# Patient Record
Sex: FEMALE | Race: WHITE | Employment: OTHER | URBAN - NONMETROPOLITAN AREA
[De-identification: names, ages, dates, MRNs, and addresses within clinical notes are randomized per-mention and may not be internally consistent; named-entity substitution may affect disease eponyms.]

---

## 2023-12-13 ENCOUNTER — HOSPITAL ENCOUNTER (INPATIENT)
Facility: HOSPITAL | Age: 68
LOS: 13 days | Discharge: HOME | DRG: 885 | End: 2023-12-26
Attending: PSYCHIATRY & NEUROLOGY | Admitting: PSYCHIATRY & NEUROLOGY
Payer: MEDICARE

## 2023-12-13 PROBLEM — F20.9 SCHIZOPHRENIA (HCC): Status: ACTIVE | Noted: 2023-12-13

## 2023-12-13 PROBLEM — F31.70 HISTORY OF DEPRESSED BIPOLAR DISORDER (HCC): Status: ACTIVE | Noted: 2023-12-13

## 2023-12-13 LAB — TSH SERPL DL<=0.05 MIU/L-ACNC: 1.13 UIU/ML (ref 0.36–3.74)

## 2023-12-13 PROCEDURE — 36415 COLL VENOUS BLD VENIPUNCTURE: CPT

## 2023-12-13 PROCEDURE — 6370000000 HC RX 637 (ALT 250 FOR IP): Performed by: PHYSICIAN ASSISTANT

## 2023-12-13 PROCEDURE — 80061 LIPID PANEL: CPT

## 2023-12-13 PROCEDURE — 84443 ASSAY THYROID STIM HORMONE: CPT

## 2023-12-13 PROCEDURE — 6370000000 HC RX 637 (ALT 250 FOR IP): Performed by: PSYCHIATRY & NEUROLOGY

## 2023-12-13 PROCEDURE — 1240000000 HC EMOTIONAL WELLNESS R&B

## 2023-12-13 RX ORDER — CHLORTHALIDONE 25 MG/1
25 TABLET ORAL DAILY
COMMUNITY

## 2023-12-13 RX ORDER — HALOPERIDOL 5 MG/1
5 TABLET ORAL EVERY 4 HOURS PRN
Status: DISCONTINUED | OUTPATIENT
Start: 2023-12-13 | End: 2023-12-26 | Stop reason: HOSPADM

## 2023-12-13 RX ORDER — BUPROPION HYDROCHLORIDE 150 MG/1
450 TABLET ORAL EVERY MORNING
Status: ON HOLD | COMMUNITY
End: 2023-12-25 | Stop reason: HOSPADM

## 2023-12-13 RX ORDER — CHLORTHALIDONE 25 MG/1
25 TABLET ORAL DAILY
Status: DISCONTINUED | OUTPATIENT
Start: 2023-12-13 | End: 2023-12-26 | Stop reason: HOSPADM

## 2023-12-13 RX ORDER — HYDROXYZINE PAMOATE 25 MG/1
50 CAPSULE ORAL 4 TIMES DAILY PRN
Status: DISCONTINUED | OUTPATIENT
Start: 2023-12-13 | End: 2023-12-26 | Stop reason: HOSPADM

## 2023-12-13 RX ORDER — ENALAPRIL MALEATE 5 MG/1
10 TABLET ORAL DAILY
Status: DISCONTINUED | OUTPATIENT
Start: 2023-12-13 | End: 2023-12-26 | Stop reason: HOSPADM

## 2023-12-13 RX ORDER — ASPIRIN 81 MG/1
81 TABLET ORAL DAILY
Status: DISCONTINUED | OUTPATIENT
Start: 2023-12-13 | End: 2023-12-26 | Stop reason: HOSPADM

## 2023-12-13 RX ORDER — POLYETHYLENE GLYCOL 3350 17 G/17G
17 POWDER, FOR SOLUTION ORAL DAILY PRN
Status: DISCONTINUED | OUTPATIENT
Start: 2023-12-13 | End: 2023-12-26 | Stop reason: HOSPADM

## 2023-12-13 RX ORDER — ZOLPIDEM TARTRATE 5 MG/1
10 TABLET ORAL NIGHTLY PRN
Status: DISCONTINUED | OUTPATIENT
Start: 2023-12-13 | End: 2023-12-13

## 2023-12-13 RX ORDER — HALOPERIDOL 5 MG/ML
5 INJECTION INTRAMUSCULAR EVERY 4 HOURS PRN
Status: DISCONTINUED | OUTPATIENT
Start: 2023-12-13 | End: 2023-12-26 | Stop reason: HOSPADM

## 2023-12-13 RX ORDER — HYDROXYZINE 50 MG/1
50 TABLET, FILM COATED ORAL 3 TIMES DAILY PRN
Status: DISCONTINUED | OUTPATIENT
Start: 2023-12-13 | End: 2023-12-26 | Stop reason: HOSPADM

## 2023-12-13 RX ORDER — TRAZODONE HYDROCHLORIDE 50 MG/1
50 TABLET ORAL NIGHTLY PRN
Status: DISCONTINUED | OUTPATIENT
Start: 2023-12-13 | End: 2023-12-16

## 2023-12-13 RX ORDER — MAGNESIUM HYDROXIDE/ALUMINUM HYDROXICE/SIMETHICONE 120; 1200; 1200 MG/30ML; MG/30ML; MG/30ML
30 SUSPENSION ORAL EVERY 6 HOURS PRN
Status: DISCONTINUED | OUTPATIENT
Start: 2023-12-13 | End: 2023-12-26 | Stop reason: HOSPADM

## 2023-12-13 RX ORDER — LEVOTHYROXINE SODIUM 0.05 MG/1
50 TABLET ORAL DAILY
Status: DISCONTINUED | OUTPATIENT
Start: 2023-12-13 | End: 2023-12-26 | Stop reason: HOSPADM

## 2023-12-13 RX ORDER — POTASSIUM CHLORIDE 1.5 G/1.58G
8 POWDER, FOR SOLUTION ORAL 2 TIMES DAILY
COMMUNITY

## 2023-12-13 RX ORDER — POLYETHYLENE GLYCOL 3350 17 G/17G
17 POWDER, FOR SOLUTION ORAL 2 TIMES DAILY
COMMUNITY

## 2023-12-13 RX ORDER — LEVOTHYROXINE SODIUM 0.05 MG/1
50 TABLET ORAL DAILY
COMMUNITY

## 2023-12-13 RX ORDER — LURASIDONE HYDROCHLORIDE 40 MG/1
20 TABLET, FILM COATED ORAL
Status: ON HOLD | COMMUNITY
End: 2023-12-25 | Stop reason: HOSPADM

## 2023-12-13 RX ORDER — BUPROPION HYDROCHLORIDE 150 MG/1
450 TABLET ORAL DAILY
Status: DISCONTINUED | OUTPATIENT
Start: 2023-12-14 | End: 2023-12-14

## 2023-12-13 RX ORDER — POTASSIUM CHLORIDE 750 MG/1
10 TABLET, FILM COATED, EXTENDED RELEASE ORAL 2 TIMES DAILY
Status: DISCONTINUED | OUTPATIENT
Start: 2023-12-13 | End: 2023-12-17

## 2023-12-13 RX ORDER — ENALAPRIL MALEATE 10 MG/1
10 TABLET ORAL DAILY
COMMUNITY

## 2023-12-13 RX ORDER — DIPHENHYDRAMINE HYDROCHLORIDE 50 MG/ML
50 INJECTION INTRAMUSCULAR; INTRAVENOUS EVERY 4 HOURS PRN
Status: DISCONTINUED | OUTPATIENT
Start: 2023-12-13 | End: 2023-12-26 | Stop reason: HOSPADM

## 2023-12-13 RX ORDER — BUPROPION HYDROCHLORIDE 150 MG/1
450 TABLET, EXTENDED RELEASE ORAL DAILY
Status: DISCONTINUED | OUTPATIENT
Start: 2023-12-13 | End: 2023-12-13

## 2023-12-13 RX ORDER — ACETAMINOPHEN 325 MG/1
650 TABLET ORAL EVERY 4 HOURS PRN
Status: DISCONTINUED | OUTPATIENT
Start: 2023-12-13 | End: 2023-12-26 | Stop reason: HOSPADM

## 2023-12-13 RX ADMIN — TRAZODONE HYDROCHLORIDE 50 MG: 50 TABLET ORAL at 20:07

## 2023-12-13 RX ADMIN — POTASSIUM CHLORIDE 10 MEQ: 750 TABLET, EXTENDED RELEASE ORAL at 20:07

## 2023-12-13 RX ADMIN — HYDROXYZINE PAMOATE 50 MG: 25 CAPSULE ORAL at 17:12

## 2023-12-13 NOTE — CONSULTS
Hospitalist Consult Note             Chief Complaints:   Suicidal attempt      Subjective:     Trisha Brady is a 70 y.o. female followed by Abdullahi Ybarra MD and  has a past medical history of Bipolar 2 disorder ([address]), Generalized anxiety disorder with panic attacks, Hyperlipidemia, Hypertension, PTSD (post-traumatic stress disorder), and Thyroid disease. Presents from outside ED in [facility] after patient reported to taking 20 Ambien 10 mg pills and an attempted suicide with plan of falling asleep and not wake up.  found patient on floor after getting up in the middle the night and attempt to go to the bathroom has bruising to the left forehead she has chronic issues with an more acutely worsening of her severe depression over this past month with noted increasing crying spells. During these episodes she has decreased ability to function and do her ADLs for that reason she is not bathing for the past 2 to 3 weeks. Patient has had no history of suicide attempts after initial evaluation for fall was then referred for admission to our behavioral health unit at [facility] for further evaluation and treatment      Past Medical History:   Diagnosis Date    Bipolar 2 disorder ([address])     Generalized anxiety disorder with panic attacks     Hyperlipidemia     Hypertension     PTSD (post-traumatic stress disorder)     Thyroid disease       History reviewed. No pertinent surgical history. Family History   Family history unknown: Yes      Social History     Tobacco Use    Smoking status: Never    Smokeless tobacco: Never   Substance Use Topics    Alcohol use: Never       Prior to Admission medications    Not on File     Allergies   Allergen Reactions    Lamictal [Lamotrigine] Anaphylaxis    Tetracyclines & Related Anaphylaxis        Review of Systems:  Review of Systems   Constitutional: Negative. HENT: Negative. Eyes: Negative. Respiratory: Negative.      Cardiovascular:

## 2023-12-13 NOTE — GROUP NOTE
Group Therapy Note    Date: 12/13/2023    Group Start Time: 6803  Group End Time: 1450  Group Topic: Process Group - Inpatient    [facility] [address] [facility], [place]        Group Therapy Note    Attendees: 3/4    Writer facilitated an inpatient processing group. Writer encouraged patients to engage in a reflective activity titled \"Early Memories. \" Therapeutic purpose of the activity was for patients to reflect on early memories that have influenced who they are today. Writer held the space as patients processed. Writer encouraged patients to express any feelings they may be having, discuss discharge plans, etc. Writer concluded session with a grounding exercise and encouraging patients to provide input and feedback regarding the group. Pt did not attend group. Pt sleeping.              Signature:  Jaci Oropeza

## 2023-12-13 NOTE — GROUP NOTE
Group Therapy Note    Date: 12/13/2023    Group Start Time: 0618  Group End Time: 2085  Group Topic: Psychoeducation    [facility] [address]  [facility]        Group Therapy Note    Attendees: 3/4    Writer facilitated an inpatient psych-ed group regarding Building New Habits. Writer provided a handout regarding tips on how to change habits which may be impacting mental health. Writer encouraged patients to share various habits they want to change and work on as it relates to substance use, depression etc. Writer concluded session with a review and grounding exercise. Pt sleeping at time of group.        Signature:  Marleny Ambriz

## 2023-12-14 LAB
CHOLEST SERPL-MCNC: 193 MG/DL
EST. AVERAGE GLUCOSE BLD GHB EST-MCNC: 100 MG/DL
HBA1C MFR BLD: 5.1 % (ref 4–5.6)
HDLC SERPL-MCNC: 56 MG/DL
HDLC SERPL: 3.4 (ref 0–5)
LDLC SERPL CALC-MCNC: 115.4 MG/DL (ref 0–100)
LIPID PANEL: ABNORMAL
TRIGL SERPL-MCNC: 108 MG/DL
VLDLC SERPL CALC-MCNC: 21.6 MG/DL

## 2023-12-14 PROCEDURE — 6370000000 HC RX 637 (ALT 250 FOR IP): Performed by: PSYCHIATRY & NEUROLOGY

## 2023-12-14 PROCEDURE — 83036 HEMOGLOBIN GLYCOSYLATED A1C: CPT

## 2023-12-14 PROCEDURE — 97161 PT EVAL LOW COMPLEX 20 MIN: CPT

## 2023-12-14 PROCEDURE — 97165 OT EVAL LOW COMPLEX 30 MIN: CPT

## 2023-12-14 PROCEDURE — 36415 COLL VENOUS BLD VENIPUNCTURE: CPT

## 2023-12-14 PROCEDURE — 1240000000 HC EMOTIONAL WELLNESS R&B

## 2023-12-14 PROCEDURE — 6370000000 HC RX 637 (ALT 250 FOR IP): Performed by: PHYSICIAN ASSISTANT

## 2023-12-14 RX ORDER — MIRTAZAPINE 15 MG/1
7.5 TABLET, FILM COATED ORAL NIGHTLY
Status: DISCONTINUED | OUTPATIENT
Start: 2023-12-14 | End: 2023-12-26 | Stop reason: HOSPADM

## 2023-12-14 RX ORDER — BUPROPION HYDROCHLORIDE 150 MG/1
450 TABLET ORAL DAILY
Status: DISCONTINUED | OUTPATIENT
Start: 2023-12-14 | End: 2023-12-26 | Stop reason: HOSPADM

## 2023-12-14 RX ORDER — BUPROPION HYDROCHLORIDE 150 MG/1
450 TABLET, EXTENDED RELEASE ORAL DAILY
Status: DISCONTINUED | OUTPATIENT
Start: 2023-12-14 | End: 2023-12-14

## 2023-12-14 RX ORDER — LURASIDONE HYDROCHLORIDE 40 MG/1
80 TABLET, FILM COATED ORAL
Status: DISCONTINUED | OUTPATIENT
Start: 2023-12-15 | End: 2023-12-26 | Stop reason: HOSPADM

## 2023-12-14 RX ADMIN — ASPIRIN 81 MG: 81 TABLET, COATED ORAL at 08:25

## 2023-12-14 RX ADMIN — HYDROXYZINE PAMOATE 50 MG: 25 CAPSULE ORAL at 01:07

## 2023-12-14 RX ADMIN — Medication 5000 UNITS: at 08:26

## 2023-12-14 RX ADMIN — POTASSIUM CHLORIDE 10 MEQ: 750 TABLET, EXTENDED RELEASE ORAL at 20:51

## 2023-12-14 RX ADMIN — LURASIDONE HYDROCHLORIDE 60 MG: 40 TABLET, FILM COATED ORAL at 08:24

## 2023-12-14 RX ADMIN — MIRTAZAPINE 7.5 MG: 15 TABLET, FILM COATED ORAL at 20:51

## 2023-12-14 RX ADMIN — POTASSIUM CHLORIDE 10 MEQ: 750 TABLET, EXTENDED RELEASE ORAL at 08:25

## 2023-12-14 RX ADMIN — TRAZODONE HYDROCHLORIDE 50 MG: 50 TABLET ORAL at 23:03

## 2023-12-14 RX ADMIN — ENALAPRIL MALEATE 10 MG: 5 TABLET ORAL at 08:21

## 2023-12-14 RX ADMIN — BUPROPION HYDROCHLORIDE 450 MG: 150 TABLET ORAL at 08:31

## 2023-12-14 RX ADMIN — LEVOTHYROXINE SODIUM 50 MCG: 0.05 TABLET ORAL at 06:15

## 2023-12-14 RX ADMIN — CHLORTHALIDONE 25 MG: 25 TABLET ORAL at 08:26

## 2023-12-14 NOTE — GROUP NOTE
Group Therapy Note    Date: 12/14/2023    Group Start Time: 0490  Group End Time: 1430  Group Topic: Psychoeducation    [facility] [address]        Group Therapy Note    Attendees: 3/5    Writer facilitated an inpatient psych-ed group. Writer provided a CBT handout regarding cognitive distortions and irrational thinking. Writer encouraged patients to go through the list and discuss what areas of distortions they may be struggling with and need to improve on. Writer held the space as patients processed. Writer concluded session with a grounding exercise. Patient's Goal:  To attend and participate in groups and activities daily. Notes:  Pt actively participated. Pt was able to discuss distortions she experiences. Pt states that she will often take things personally. Status After Intervention:  Improved    Participation Level:  Active Listener and Interactive    Participation Quality: Appropriate, Attentive, Sharing, and Supportive      Speech:  normal      Thought Process/Content: Logical  Linear      Affective Functioning: Congruent      Mood: euthymic      Level of consciousness:  Alert, Oriented x4, and Attentive      Response to Learning: Able to verbalize/acknowledge new learning, Able to retain information, Capable of insight, and Progressing to goal      Endings: None Reported    Modes of Intervention: Education      Discipline Responsible: /Counselor      Signature:  Swetha Manzanares [employer]

## 2023-12-14 NOTE — GROUP NOTE
Group Therapy Note    Date: 12/14/2023    Group Start Time: 1100  Group End Time: 8397  Group Topic: Nursing    [facility] [address] LPN        Group Therapy Note    Attendees: 5       Patient's Goal:  TO TAKE MEDS. Notes:  MEDICATION COMPLIANT    Status After Intervention:  Improved    Participation Level:  Active Listener    Participation Quality: Appropriate and Attentive      Speech:  normal      Thought Process/Content: Logical      Affective Functioning: Congruent      Mood:  CALM      Level of consciousness:  Alert      Response to Learning: Able to verbalize current knowledge/experience      Endings: None Reported    Modes of Intervention: Education      Discipline Responsible: Licensed Practical Nurse      Signature:  Rudolph Hatfield LPN

## 2023-12-14 NOTE — CARE COORDINATION
12/14/23 1013   ITP   Date of Plan 12/14/23   Date of Next Review 12/21/23   Primary Diagnosis Code History of depressed bipolar disorder   Barriers to Treatment Need for psychoeducation;Psychiatric symptom (comment)   Strengths Incorporated in Plan Acknowledging need for assistance; Family supports; Natural supports;Postive outlook; Seeking interactions   Plan of Care   Long Term Goal (LTG) Stated in patient/guardian terms On PSA   Short Term Goal 1   Short Term Goal 1 Client will learn and demonstrate effective coping skills   Baseline Functioning Unknown- Per pt report \"I have a few good months at a time. \"   Target TBD   Objectives Client will participate in individual therapy;Client will participate in group therapy   Intervention 1 Acknowledge client strengths   Frequency Daily   Measured by Behavioral data; Self report;Staff observation   Staff Responsible [facility] staff;Clinical staff   Intervention 2 Group therapy   Frequency Daily   Measured by Behavioral data; Self report;Staff observation   Staff Responsible [facility] staff;Clinical staff   Intervention 3 Referral to community services   Frequency Weekly   Measured by Behavioral data; Self report;Staff observation   Staff Responsible [facility] staff;Clinical staff   STG Goal 1 Status: Patient Appears to be  Partially meeting treatment plan goal   Short Term Goal 2   Short Term Goal 2 Client will maintain compliance with medication regime   Baseline Functioning Unknown   Target TBD   Objectives Client will participate in individual therapy;Client will participate in group therapy   Intervention 1 Monitor medications   Frequency Daily   Measured by Behavioral data;Staff observation   Staff Responsible [facility] staff   STG Goal 2 Status: Patient Appears to be  Progressing toward treatment plan goal   Crisis/Safety/Discharge Plan   Crisis/Safety Plan Standard program interventions and protocol   Comprehensive Assessment Completion Date 12/14/23   Discharge Plan Home with outpatient

## 2023-12-14 NOTE — GROUP NOTE
Group Therapy Note    Date: 12/14/2023    Group Start Time: 1430  Group End Time: 1520  Group Topic: Process Group - Inpatient    [facility], [address] [place] [address]        Group Therapy Note    Attendees: 3/5    Writer facilitated an inpatient processing group. Writer encouraged patients to engage in expressive arts mindfulness activities. Writer held the space as patients processed. Writer encouraged patients to discuss discharge plans, express any feelings they may be having, etc. Writer concluded session with a grounding exercise and encouraging patients to provide input and feedback regarding the group. Patient's Goal:  To attend and participate in groups and activities daily. Notes:  Pt actively participated. Pt was able to discuss discharge plans and opened up about her depression. Status After Intervention:  Improved    Participation Level:  Active Listener and Interactive    Participation Quality: Appropriate, Attentive, Sharing, and Supportive      Speech:  normal      Thought Process/Content: Logical  Linear      Affective Functioning: Congruent      Mood: euthymic      Level of consciousness:  Alert, Oriented x4, and Attentive      Response to Learning: Able to verbalize/acknowledge new learning, Able to retain information, Capable of insight, and Progressing to goal      Endings: None Reported    Modes of Intervention: Exploration and Activity      Discipline Responsible: /Counselor      Signature:  [employer]

## 2023-12-14 NOTE — GROUP NOTE
Group Therapy Note    Date: 12/14/2023    Group Start Time: 1000  Group End Time: 8214  Group Topic: Community Meeting    [facility] [place], [place]        Group Therapy Note    Attendees: 4       Patient's Goal:  To Feel More Positive    Notes:      Status After Intervention:  Improved    Participation Level:  Active Listener    Participation Quality: Appropriate      Speech:  normal      Thought Process/Content: Logical      Affective Functioning: Congruent      Mood: anxious      Level of consciousness:  Alert      Response to Learning: Able to verbalize current knowledge/experience      Endings: None Reported    Modes of Intervention: Support      Discipline Responsible: [address] [facility]      Signature:  Malcolm Cortes

## 2023-12-15 PROCEDURE — 6370000000 HC RX 637 (ALT 250 FOR IP): Performed by: PSYCHIATRY & NEUROLOGY

## 2023-12-15 PROCEDURE — 1240000000 HC EMOTIONAL WELLNESS R&B

## 2023-12-15 PROCEDURE — 6370000000 HC RX 637 (ALT 250 FOR IP): Performed by: PHYSICIAN ASSISTANT

## 2023-12-15 RX ADMIN — BUPROPION HYDROCHLORIDE 450 MG: 150 TABLET ORAL at 08:39

## 2023-12-15 RX ADMIN — TRAZODONE HYDROCHLORIDE 50 MG: 50 TABLET ORAL at 21:13

## 2023-12-15 RX ADMIN — ENALAPRIL MALEATE 10 MG: 5 TABLET ORAL at 08:38

## 2023-12-15 RX ADMIN — POTASSIUM CHLORIDE 10 MEQ: 750 TABLET, EXTENDED RELEASE ORAL at 08:38

## 2023-12-15 RX ADMIN — HYDROXYZINE PAMOATE 50 MG: 25 CAPSULE ORAL at 13:45

## 2023-12-15 RX ADMIN — ASPIRIN 81 MG: 81 TABLET, COATED ORAL at 08:38

## 2023-12-15 RX ADMIN — POTASSIUM CHLORIDE 10 MEQ: 750 TABLET, EXTENDED RELEASE ORAL at 21:14

## 2023-12-15 RX ADMIN — CHLORTHALIDONE 25 MG: 25 TABLET ORAL at 08:38

## 2023-12-15 RX ADMIN — MIRTAZAPINE 7.5 MG: 15 TABLET, FILM COATED ORAL at 21:13

## 2023-12-15 RX ADMIN — LURASIDONE HYDROCHLORIDE 80 MG: 40 TABLET, FILM COATED ORAL at 08:38

## 2023-12-15 RX ADMIN — Medication 5000 UNITS: at 08:38

## 2023-12-15 RX ADMIN — LEVOTHYROXINE SODIUM 50 MCG: 0.05 TABLET ORAL at 06:10

## 2023-12-15 NOTE — GROUP NOTE
Group Therapy Note    Date: 12/15/2023    Group Start Time: 0900  Group End Time: 1000  Group Topic: Community Meeting    [facility] [place]        Group Therapy Note    Attendees: 3       Patient's Goal:   Be more Positive    Notes:   Slept 4 hrs. Ate 1/2 meals    Listed depression 5 Anxiety 5      Pain 0    Progress 5    Handled Shower and Group therapy well No thoughts of self harm  No complaints  about meds     Status After Intervention:  Improved    Participation Level:  Active Listener    Participation Quality: Appropriate      Speech:  normal      Thought Process/Content: Logical      Affective Functioning: Congruent      Mood:  calm      Level of consciousness:  Alert      Response to Learning: Able to verbalize/acknowledge new learning      Endings: None Reported    Modes of Intervention: Support      Discipline Responsible: [address]      Signature:  Mónica Lugo

## 2023-12-16 PROCEDURE — 6370000000 HC RX 637 (ALT 250 FOR IP): Performed by: PSYCHIATRY & NEUROLOGY

## 2023-12-16 PROCEDURE — 1240000000 HC EMOTIONAL WELLNESS R&B

## 2023-12-16 PROCEDURE — 6370000000 HC RX 637 (ALT 250 FOR IP): Performed by: PHYSICIAN ASSISTANT

## 2023-12-16 RX ORDER — TRAZODONE HYDROCHLORIDE 100 MG/1
100 TABLET ORAL NIGHTLY PRN
Status: DISCONTINUED | OUTPATIENT
Start: 2023-12-16 | End: 2023-12-26 | Stop reason: HOSPADM

## 2023-12-16 RX ADMIN — BUPROPION HYDROCHLORIDE 450 MG: 150 TABLET ORAL at 08:26

## 2023-12-16 RX ADMIN — MIRTAZAPINE 7.5 MG: 15 TABLET, FILM COATED ORAL at 20:40

## 2023-12-16 RX ADMIN — ENALAPRIL MALEATE 10 MG: 5 TABLET ORAL at 08:18

## 2023-12-16 RX ADMIN — HYDROXYZINE PAMOATE 50 MG: 25 CAPSULE ORAL at 00:15

## 2023-12-16 RX ADMIN — LURASIDONE HYDROCHLORIDE 80 MG: 40 TABLET, FILM COATED ORAL at 08:18

## 2023-12-16 RX ADMIN — TRAZODONE HYDROCHLORIDE 100 MG: 100 TABLET ORAL at 20:40

## 2023-12-16 RX ADMIN — Medication 5000 UNITS: at 08:18

## 2023-12-16 RX ADMIN — CHLORTHALIDONE 25 MG: 25 TABLET ORAL at 08:18

## 2023-12-16 RX ADMIN — POTASSIUM CHLORIDE 10 MEQ: 750 TABLET, EXTENDED RELEASE ORAL at 08:18

## 2023-12-16 RX ADMIN — LEVOTHYROXINE SODIUM 50 MCG: 0.05 TABLET ORAL at 06:05

## 2023-12-16 RX ADMIN — ASPIRIN 81 MG: 81 TABLET, COATED ORAL at 08:18

## 2023-12-16 RX ADMIN — POTASSIUM CHLORIDE 10 MEQ: 750 TABLET, EXTENDED RELEASE ORAL at 20:40

## 2023-12-16 NOTE — GROUP NOTE
Group Therapy Note    Date: 12/16/2023    Group Start Time: 0915  Group End Time: 1030  Group Topic: Community Meeting    [facility] [address] [place]        Group Therapy Note    Attendees:3       Patient's Goal:  Attend Activities    Notes:   Slept 3-4 Hrs  ate 1/2 of meals     patient reports  Depression 5   Anxiety 5    Pain 0    No comment on Progress  Had a problem getting adequate sleep   Listed ability to handle symptom as 5     Status After Intervention:  Improved    Participation Level:   Active participant       Participation Quality: Attentive and Sharing      Speech:  normal      Thought Process/Content: Logical      Affective Functioning: Congruent      Mood:  Engaged      Level of consciousness:  Alert      Response to Learning: Able to verbalize current knowledge/experience, Able to verbalize/acknowledge new learning, and Able to retain information      Endings: None Reported    Modes of Intervention: Education      Discipline Responsible: [address] [facility]      Signature:  Norman Tellez

## 2023-12-16 NOTE — GROUP NOTE
Group Therapy Note    Date: 12/16/2023    Group Start Time: 1100  Group End Time: 2101  Group Topic: Nursing    [facility] [address]        Group Therapy Note    Attendees: 4       Patient's Goal:  To understand the medications she is taking     Notes:  Pt is very well informed of the medications she Is taking. 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 Status After Intervention:  Improved    Participation Level:  Active Listener and Interactive    Participation Quality: Appropriate, Attentive, and Sharing      Speech:  normal      Thought Process/Content: Logical      Affective Functioning: Congruent      Mood:  calm      Level of consciousness:  Alert, Oriented x4, and Attentive      Response to Learning: Able to verbalize current knowledge/experience, Able to verbalize/acknowledge new learning, Able to retain information, Capable of insight, and Progressing to goal      Endings: None Reported    Modes of Intervention: Education and Support      Discipline Responsible: Registered Nurse      Signature:  Juana Farrar RN

## 2023-12-17 PROCEDURE — 1240000000 HC EMOTIONAL WELLNESS R&B

## 2023-12-17 PROCEDURE — 6370000000 HC RX 637 (ALT 250 FOR IP): Performed by: PSYCHIATRY & NEUROLOGY

## 2023-12-17 PROCEDURE — 6370000000 HC RX 637 (ALT 250 FOR IP): Performed by: PHYSICIAN ASSISTANT

## 2023-12-17 RX ORDER — POTASSIUM CHLORIDE 750 MG/1
10 TABLET, FILM COATED, EXTENDED RELEASE ORAL 2 TIMES DAILY WITH MEALS
Status: DISCONTINUED | OUTPATIENT
Start: 2023-12-17 | End: 2023-12-26 | Stop reason: HOSPADM

## 2023-12-17 RX ADMIN — Medication 5000 UNITS: at 08:22

## 2023-12-17 RX ADMIN — BUPROPION HYDROCHLORIDE 450 MG: 150 TABLET ORAL at 08:22

## 2023-12-17 RX ADMIN — TRAZODONE HYDROCHLORIDE 100 MG: 100 TABLET ORAL at 20:49

## 2023-12-17 RX ADMIN — HYDROXYZINE PAMOATE 50 MG: 25 CAPSULE ORAL at 20:58

## 2023-12-17 RX ADMIN — POTASSIUM CHLORIDE 10 MEQ: 750 TABLET, EXTENDED RELEASE ORAL at 08:22

## 2023-12-17 RX ADMIN — ENALAPRIL MALEATE 10 MG: 5 TABLET ORAL at 08:22

## 2023-12-17 RX ADMIN — MIRTAZAPINE 7.5 MG: 15 TABLET, FILM COATED ORAL at 20:49

## 2023-12-17 RX ADMIN — LURASIDONE HYDROCHLORIDE 80 MG: 40 TABLET, FILM COATED ORAL at 08:22

## 2023-12-17 RX ADMIN — ASPIRIN 81 MG: 81 TABLET, COATED ORAL at 08:22

## 2023-12-17 RX ADMIN — CHLORTHALIDONE 25 MG: 25 TABLET ORAL at 08:22

## 2023-12-17 RX ADMIN — LEVOTHYROXINE SODIUM 50 MCG: 0.05 TABLET ORAL at 05:52

## 2023-12-17 RX ADMIN — POTASSIUM CHLORIDE 10 MEQ: 750 TABLET, EXTENDED RELEASE ORAL at 17:21

## 2023-12-17 NOTE — H&P
Yes Provider, MD Isabela   potassium chloride (KLOR-CON) 20 MEQ packet Take 8 mEq by mouth 2 times daily   Yes Provider, MD Isabela       PAST MEDICAL HISTORY:   Past Medical History:   Diagnosis Date    Bipolar 2 disorder ([address])     Generalized anxiety disorder with panic attacks     Hyperlipidemia     Hypertension     PTSD (post-traumatic stress disorder)     Thyroid disease    History reviewed. No pertinent surgical history. PAST PSYCHIATRIC HISTORY:    Cannot remember the last inpatient hospitalization. Has been managed outpatient for many years by same psychiatrist at [facility]. She has seen therapist but recently not on a regular basis. Diagnosis has remained bipolar in nature mixed with varying degrees of cycling. She does endorse a strong family history of psychiatric disorders     SOCIAL HISTORY:      The patient is  for 47 years. She describes her relationship as excellent with children and grandchildren with whom she is very close. 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 She is active in her Orthodoxy. Denies any legal issues. FAMILY HISTORY: Chronic insomnia history reviewed. No pertinent family history. Family History   Family history unknown: Yes       REVIEW OF SYSTEMS:   Pertinent items are noted in the History of Present Illness. All other Systems reviewed and are considered negative. MENTAL STATUS EXAM & VITALS       Appearance/Hygiene 70 y.o.  White (non-) female  Hygiene: appropriate for venue   Behavior/Social Relatedness Appropriate for setting   Musculoskeletal No Involuntary movements (tics, dyskinesias, akathisa, stereotypies)   Speech   Rate, rhythm, volume, fluency and articulation are appropriate   Mood   Depressed   Affect    Constricted, flat and mood congruent   Thought Process Linear with limited goal direction    Denies vagueness, incoherence, circumstantiality, tangentiality, neologisms,

## 2023-12-17 NOTE — GROUP NOTE
Group Therapy Note    Date: 12/17/2023    Group Start Time: 0930  Group End Time: 7910  Group Topic: Community Meeting    [facility] [address] [place], [address] [facility]        Group Therapy Note    Attendees 3         Patient's Goal:  Think more positive    Notes:   Sleep quality 3-4 hrs   Meal consumption 1/2  Depression 8 Anxiety 5 Pain 0   Progress ? Ability to manage  symptoms 4   Situation handled well: Talked with doctor. Problem situation   Staying awake often  No thoughts of self harm  Is taking prescribed medications  Listed shaking as a Side effect of  medication       Status After Intervention:  Improved    Participation Level:  Active Listener and Interactive    Participation Quality: Appropriate      Speech:  normal      Thought Process/Content: Logical      Affective Functioning: Congruent      Mood: depressed      Level of consciousness:  Calm /relaxed      Response to Learning: Able to verbalize current knowledge/experience and Able to retain information      Endings: None Reported    Modes of Intervention: Support      Discipline Responsible: [address] [facility]      Signature:  Tiffany Haney

## 2023-12-18 PROBLEM — G47.00 INSOMNIA, UNSPECIFIED: Status: ACTIVE | Noted: 2023-12-18

## 2023-12-18 PROBLEM — F31.63 BIPOLAR 1 DISORDER, MIXED, SEVERE (HCC): Status: ACTIVE | Noted: 2023-12-18

## 2023-12-18 LAB — POTASSIUM SERPL-SCNC: 3.4 MMOL/L (ref 3.5–5.1)

## 2023-12-18 PROCEDURE — 1240000000 HC EMOTIONAL WELLNESS R&B

## 2023-12-18 PROCEDURE — 84132 ASSAY OF SERUM POTASSIUM: CPT

## 2023-12-18 PROCEDURE — 6370000000 HC RX 637 (ALT 250 FOR IP): Performed by: PHYSICIAN ASSISTANT

## 2023-12-18 PROCEDURE — 36415 COLL VENOUS BLD VENIPUNCTURE: CPT

## 2023-12-18 PROCEDURE — 6370000000 HC RX 637 (ALT 250 FOR IP): Performed by: PSYCHIATRY & NEUROLOGY

## 2023-12-18 RX ADMIN — Medication 5000 UNITS: at 08:53

## 2023-12-18 RX ADMIN — ENALAPRIL MALEATE 10 MG: 5 TABLET ORAL at 08:53

## 2023-12-18 RX ADMIN — LURASIDONE HYDROCHLORIDE 80 MG: 40 TABLET, FILM COATED ORAL at 08:53

## 2023-12-18 RX ADMIN — HYDROXYZINE PAMOATE 50 MG: 25 CAPSULE ORAL at 20:34

## 2023-12-18 RX ADMIN — ASPIRIN 81 MG: 81 TABLET, COATED ORAL at 08:53

## 2023-12-18 RX ADMIN — MIRTAZAPINE 7.5 MG: 15 TABLET, FILM COATED ORAL at 20:35

## 2023-12-18 RX ADMIN — BUPROPION HYDROCHLORIDE 450 MG: 150 TABLET ORAL at 08:58

## 2023-12-18 RX ADMIN — LEVOTHYROXINE SODIUM 50 MCG: 0.05 TABLET ORAL at 06:03

## 2023-12-18 RX ADMIN — POTASSIUM CHLORIDE 10 MEQ: 750 TABLET, EXTENDED RELEASE ORAL at 08:53

## 2023-12-18 RX ADMIN — CHLORTHALIDONE 25 MG: 25 TABLET ORAL at 08:53

## 2023-12-18 RX ADMIN — TRAZODONE HYDROCHLORIDE 100 MG: 100 TABLET ORAL at 20:35

## 2023-12-18 RX ADMIN — POTASSIUM CHLORIDE 10 MEQ: 750 TABLET, EXTENDED RELEASE ORAL at 17:56

## 2023-12-18 NOTE — GROUP NOTE
Group Therapy Note    Date: 12/18/2023    Group Start Time: 9969  Group End Time: 1500  Group Topic: Process Group - Inpatient    [facility] [address]  [facility]        Group Therapy Note    Attendees: 4/6    Writer facilitated an inpatient processing group. Writer provided a reflective activity titled \"Personal Shield\" in which patients were asked to reflect on protective factors in their life. Writer encouraged patients to express feelings, discuss discharge plans, etc. Writer held the space as patients processed. Writer concluded session with a grounding exercise and various expressive arts activities. Writer encouraged patients to provide input and feedback regarding the group. Patient's Goal:  To attend and participate in groups and activities daily. Notes:  Pt actively participated. Pt was able to discuss protective factors and was able to discuss discharge plans. Status After Intervention:  Improved    Participation Level:  Active Listener and Interactive    Participation Quality: Appropriate, Attentive, Sharing, and Supportive      Speech:  normal      Thought Process/Content: Logical  Linear      Affective Functioning: Congruent      Mood: euthymic      Level of consciousness:  Alert, Oriented x4, and Attentive      Response to Learning: Able to retain information, Capable of insight, and Progressing to goal      Endings: None Reported    Modes of Intervention: Exploration and Activity      Discipline Responsible: /Counselor      Signature:  [employer]

## 2023-12-18 NOTE — GROUP NOTE
Group Therapy Note    Date: 12/18/2023    Group Start Time: 1000  Group End Time: 1131  Group Topic: Community Meeting    [facility] [place], [place]        Group Therapy Note    Attendees: 3       Patient's Goal:  To Relax    Notes:      Status After Intervention:  Unchanged    Participation Level:  Active Listener    Participation Quality: Appropriate      Speech:  normal      Thought Process/Content: Logical      Affective Functioning: Congruent      Mood: anxious      Level of consciousness:  Alert      Response to Learning: Able to verbalize current knowledge/experience      Endings: None Reported    Modes of Intervention: Support      Discipline Responsible: [address] [facility]      Signature:  Jeni Amaya

## 2023-12-18 NOTE — GROUP NOTE
Group Therapy Note    Date: 12/18/2023    Group Start Time: 1011  Group End Time: 8770  Group Topic: Psychoeducation    [facility] [address]        Group Therapy Note    Attendees: 4/6    Writer facilitated an inpatient psych-ed group. Writer provided a handout about the different types of boundaries and processed with pts about the characteristics of healthy boundaries and how to establish them. Writer encouraged patients to share and ask questions. Writer concluded session with a grounding exercise. Patient's Goal:  To attend and participate in groups and activities daily. Notes:  Pt actively participated. Pt was able to ask questions regarding boundaries and states she needs to work on being less passive. Status After Intervention:  Improved    Participation Level:  Active Listener and Interactive    Participation Quality: Appropriate, Attentive, Sharing, and Supportive      Speech:  normal      Thought Process/Content: Logical  Linear      Affective Functioning: Congruent      Mood: euthymic      Level of consciousness:  Alert, Oriented x4, and Attentive      Response to Learning: Able to retain information, Capable of insight, and Progressing to goal      Endings: None Reported    Modes of Intervention: Education      Discipline Responsible: /Counselor      Signature:  Tammy Grijalva [facility]

## 2023-12-19 PROCEDURE — 1240000000 HC EMOTIONAL WELLNESS R&B

## 2023-12-19 PROCEDURE — 6370000000 HC RX 637 (ALT 250 FOR IP): Performed by: PHYSICIAN ASSISTANT

## 2023-12-19 PROCEDURE — 6370000000 HC RX 637 (ALT 250 FOR IP): Performed by: PSYCHIATRY & NEUROLOGY

## 2023-12-19 RX ADMIN — CHLORTHALIDONE 25 MG: 25 TABLET ORAL at 09:23

## 2023-12-19 RX ADMIN — ASPIRIN 81 MG: 81 TABLET, COATED ORAL at 09:23

## 2023-12-19 RX ADMIN — TRAZODONE HYDROCHLORIDE 100 MG: 100 TABLET ORAL at 21:34

## 2023-12-19 RX ADMIN — ENALAPRIL MALEATE 10 MG: 5 TABLET ORAL at 09:23

## 2023-12-19 RX ADMIN — LURASIDONE HYDROCHLORIDE 80 MG: 40 TABLET, FILM COATED ORAL at 09:23

## 2023-12-19 RX ADMIN — BUPROPION HYDROCHLORIDE 450 MG: 150 TABLET ORAL at 09:25

## 2023-12-19 RX ADMIN — POTASSIUM CHLORIDE 10 MEQ: 750 TABLET, EXTENDED RELEASE ORAL at 18:56

## 2023-12-19 RX ADMIN — MIRTAZAPINE 7.5 MG: 15 TABLET, FILM COATED ORAL at 21:34

## 2023-12-19 RX ADMIN — LEVOTHYROXINE SODIUM 50 MCG: 0.05 TABLET ORAL at 06:01

## 2023-12-19 RX ADMIN — Medication 5000 UNITS: at 09:23

## 2023-12-19 RX ADMIN — HYDROXYZINE PAMOATE 50 MG: 25 CAPSULE ORAL at 21:34

## 2023-12-19 RX ADMIN — POTASSIUM CHLORIDE 10 MEQ: 750 TABLET, EXTENDED RELEASE ORAL at 09:23

## 2023-12-19 NOTE — GROUP NOTE
Group Therapy Note    Date: 12/19/2023    Group Start Time: 1600  Group End Time: 4732  Group Topic: Nursing    [facility] [address]        Group Therapy Note    Attendees: 6       Patient's Goal:  To better understand coping skills and happy hormones     Notes:  Writer facilitated a group on boosting happy hormones. Writer explained the benefits of increasing these happy hormones along with following their treatment regimen. Status After Intervention:  Improved    Participation Level:  Active Listener and Interactive    Participation Quality: Appropriate, Attentive, Sharing, and Supportive      Speech:  normal      Thought Process/Content: Logical      Affective Functioning: Congruent      Mood:  calm      Level of consciousness:  Alert, Oriented x4, and Attentive      Response to Learning: Able to verbalize current knowledge/experience, Able to verbalize/acknowledge new learning, Able to retain information, Capable of insight, and Progressing to goal      Endings: None Reported    Modes of Intervention: Education, Support, and Socialization      Discipline Responsible: Registered Nurse      Signature:  Kena Tamayo RN

## 2023-12-19 NOTE — GROUP NOTE
Group Therapy Note    Date: 12/19/2023    Group Start Time: 1000  Group End Time: 0590  Group Topic: Community Meeting    [facility] [place] [facility]        Group Therapy Note    Attendees: 4       Patient's Goal:  Attend Groups    Notes:      Status After Intervention:  Improved    Participation Level:  Active Listener    Participation Quality: Appropriate      Speech:  normal      Thought Process/Content: Logical      Affective Functioning: Congruent      Mood: anxious      Level of consciousness:  Alert      Response to Learning: Able to verbalize current knowledge/experience      Endings: None Reported    Modes of Intervention: Support      Discipline Responsible: [address] [facility]      Signature:  Shonna Mendes

## 2023-12-19 NOTE — GROUP NOTE
Group Therapy Note    Date: 12/19/2023    Group Start Time: 1000  Group End Time: 6911  Group Topic: Community Meeting    [facility], [address] [facility] Therapy Note    Attendees: 4       Patient's Goal:  To Discharge      Notes:     Status After Intervention:  Improved    Participation Level:  Active Listener    Participation Quality: Appropriate      Speech:  normal      Thought Process/Content: Logical      Affective Functioning: Congruent      Mood: anxious      Level of consciousness:  Alert      Response to Learning: Able to verbalize current knowledge/experience      Endings: None Reported    Modes of Intervention: Support      Discipline Responsible: [address] [employer]      Signature:  Juliane Lugo

## 2023-12-20 PROCEDURE — 6370000000 HC RX 637 (ALT 250 FOR IP): Performed by: PHYSICIAN ASSISTANT

## 2023-12-20 PROCEDURE — 6370000000 HC RX 637 (ALT 250 FOR IP): Performed by: PSYCHIATRY & NEUROLOGY

## 2023-12-20 PROCEDURE — 1240000000 HC EMOTIONAL WELLNESS R&B

## 2023-12-20 RX ADMIN — POTASSIUM CHLORIDE 10 MEQ: 750 TABLET, EXTENDED RELEASE ORAL at 17:09

## 2023-12-20 RX ADMIN — HYDROXYZINE PAMOATE 50 MG: 25 CAPSULE ORAL at 22:09

## 2023-12-20 RX ADMIN — POTASSIUM CHLORIDE 10 MEQ: 750 TABLET, EXTENDED RELEASE ORAL at 08:59

## 2023-12-20 RX ADMIN — BUPROPION HYDROCHLORIDE 450 MG: 150 TABLET ORAL at 09:02

## 2023-12-20 RX ADMIN — LURASIDONE HYDROCHLORIDE 80 MG: 40 TABLET, FILM COATED ORAL at 08:59

## 2023-12-20 RX ADMIN — LEVOTHYROXINE SODIUM 50 MCG: 0.05 TABLET ORAL at 06:00

## 2023-12-20 RX ADMIN — ASPIRIN 81 MG: 81 TABLET, COATED ORAL at 08:59

## 2023-12-20 RX ADMIN — TRAZODONE HYDROCHLORIDE 100 MG: 100 TABLET ORAL at 20:34

## 2023-12-20 RX ADMIN — ENALAPRIL MALEATE 10 MG: 5 TABLET ORAL at 08:59

## 2023-12-20 RX ADMIN — Medication 5000 UNITS: at 08:59

## 2023-12-20 RX ADMIN — MIRTAZAPINE 7.5 MG: 15 TABLET, FILM COATED ORAL at 20:34

## 2023-12-20 RX ADMIN — ALUMINUM HYDROXIDE, MAGNESIUM HYDROXIDE, AND SIMETHICONE 30 ML: 200; 200; 20 SUSPENSION ORAL at 14:27

## 2023-12-20 RX ADMIN — HYDROXYZINE PAMOATE 50 MG: 25 CAPSULE ORAL at 11:22

## 2023-12-20 RX ADMIN — CHLORTHALIDONE 25 MG: 25 TABLET ORAL at 08:59

## 2023-12-20 NOTE — GROUP NOTE
Group Therapy Note    Date: 12/20/2023    Group Start Time: 1000  Group End Time: 8435  Group Topic: Community Meeting    [facility] [place]        Group Therapy Note    Attendees: 3       Patient's Goal:  3    Notes:      Status After Intervention:  Improved    Participation Level:  Active Listener    Participation Quality: Appropriate      Speech:  normal      Thought Process/Content: Logical      Affective Functioning: Congruent      Mood: angry      Level of consciousness:  Alert      Response to Learning: Able to verbalize current knowledge/experience      Endings: None Reported    Modes of Intervention: Support      Discipline Responsible: [address] [employer]      Signature:  Balta Chow

## 2023-12-20 NOTE — GROUP NOTE
Group Therapy Note    Date: 12/19/2023    Group Start Time: 2000  Group End Time: 2045  Group Topic: Wrap-Up    [facility]    Soy Cardenas CNA        Group Therapy Note    Attendees: 5       Patient's Goal:  none    Notes:  participated in group    Status After Intervention:  Improved    Participation Level: Active Listener    Participation Quality: Appropriate      Speech:  normal      Thought Process/Content: Logical      Affective Functioning: Congruent      Mood: anxious and depressed      Level of consciousness:  Alert and Oriented x4      Response to Learning: Able to verbalize current knowledge/experience, Able to verbalize/acknowledge new learning, Able to retain information, Capable of insight, and Progressing to goal      Endings: None Reported    Modes of Intervention: Activity      Discipline Responsible: [employer]      Signature:   Soy Cardenas CNA

## 2023-12-21 PROCEDURE — 6370000000 HC RX 637 (ALT 250 FOR IP): Performed by: PSYCHIATRY & NEUROLOGY

## 2023-12-21 PROCEDURE — 6370000000 HC RX 637 (ALT 250 FOR IP): Performed by: PHYSICIAN ASSISTANT

## 2023-12-21 PROCEDURE — 1240000000 HC EMOTIONAL WELLNESS R&B

## 2023-12-21 RX ADMIN — LEVOTHYROXINE SODIUM 50 MCG: 0.05 TABLET ORAL at 06:01

## 2023-12-21 RX ADMIN — LURASIDONE HYDROCHLORIDE 80 MG: 40 TABLET, FILM COATED ORAL at 08:54

## 2023-12-21 RX ADMIN — HYDROXYZINE PAMOATE 50 MG: 25 CAPSULE ORAL at 21:00

## 2023-12-21 RX ADMIN — TRAZODONE HYDROCHLORIDE 100 MG: 100 TABLET ORAL at 21:00

## 2023-12-21 RX ADMIN — Medication 5000 UNITS: at 08:54

## 2023-12-21 RX ADMIN — CHLORTHALIDONE 25 MG: 25 TABLET ORAL at 08:56

## 2023-12-21 RX ADMIN — BUPROPION HYDROCHLORIDE 450 MG: 150 TABLET ORAL at 08:57

## 2023-12-21 RX ADMIN — ENALAPRIL MALEATE 10 MG: 5 TABLET ORAL at 08:55

## 2023-12-21 RX ADMIN — POTASSIUM CHLORIDE 10 MEQ: 750 TABLET, EXTENDED RELEASE ORAL at 08:54

## 2023-12-21 RX ADMIN — POTASSIUM CHLORIDE 10 MEQ: 750 TABLET, EXTENDED RELEASE ORAL at 17:08

## 2023-12-21 RX ADMIN — ASPIRIN 81 MG: 81 TABLET, COATED ORAL at 08:56

## 2023-12-21 RX ADMIN — MIRTAZAPINE 7.5 MG: 15 TABLET, FILM COATED ORAL at 21:01

## 2023-12-21 NOTE — GROUP NOTE
Group Therapy Note    Date: 12/21/2023    Group Start Time: 1330  Group End Time: 1734  Group Topic: Psychoeducation    [facility] [address]        Group Therapy Note    Attendees: 4/5    Writer facilitated an inpatient psych-ed group. Writer provided a positive psychology handout regarding various strengths and their uses. Writer encouraged patients to reflect and process on various strengths they have and ways they can enhance their strengths. Writer held the space as patients processed. Writer concluded session with a grounding exercise. Patient's Goal:  To attend and participate in groups and activities daily. Notes:  Pt actively participated. Pt was able to identify various strengths she wants to improve on and build. Status After Intervention:  Improved    Participation Level:  Active Listener and Interactive    Participation Quality: Appropriate, Attentive, and Sharing      Speech:  normal      Thought Process/Content: Logical  Linear      Affective Functioning: Congruent      Mood: euthymic      Level of consciousness:  Alert, Oriented x4, and Attentive      Response to Learning: Able to verbalize current knowledge/experience, Able to retain information, and Progressing to goal      Endings: None Reported    Modes of Intervention: Education      Discipline Responsible: /Counselor      Signature:  Wiliam MurrayFair [employer]

## 2023-12-21 NOTE — GROUP NOTE
Group Therapy Note    Date: 12/21/2023    Group Start Time: 0900  Group End Time: 1030  Group Topic: Community Meeting    [facility] [place], [address] [facility] [place]        Group Therapy Note    Attendees:  Four (4)       Patient's Goal:   Less Depression and Anxiety     Notes :   Status After Intervention:  Unchanged    Participation Level:  Active Listener and Interactive    Participation Quality: Attentive      Speech:  normal      Thought Process/Content: Linear      Affective Functioning: Congruent      Mood: depressed      Level of consciousness:  Attentive      Response to Learning: Able to verbalize/acknowledge new learning, Able to retain information, and Resistant      Endings: None Reported    Modes of Intervention: Support      Discipline Responsible: [address]      Signature:  Ilene Jacobson

## 2023-12-21 NOTE — GROUP NOTE
Group Therapy Note    Date: 12/21/2023    Group Start Time: 1410  Group End Time: 1500  Group Topic: Process Group - Inpatient    [facility] [address], [address] [facility]        Group Therapy Note    Attendees: 4/5    Writer facilitated an inpatient processing group. Writer implemented various therapeutic expressive arts exercises including quotes, paint, etc. Writer encouraged patients to discuss discharge plans, express any feelings they may be having, etc. Writer held the space as patients processed. Writer concluded session with a grounding exercise and encouraging patients to provide input and feedback regarding the group. Patient's Goal:  To attend and participate in groups and activities daily. Notes:  Pt actively participated. Pt was able to engage in mindfulness and discussed discharge plans. Status After Intervention:  Improved    Participation Level:  Active Listener and Interactive    Participation Quality: Appropriate, Attentive, Sharing, and Supportive      Speech:  normal      Thought Process/Content: Logical  Linear      Affective Functioning: Congruent      Mood: euthymic      Level of consciousness:  Alert, Oriented x4, and Attentive      Response to Learning: Able to retain information, Capable of insight, and Progressing to goal      Endings: None Reported    Modes of Intervention: Exploration and Activity      Discipline Responsible: /Counselor      Signature:  Wiliam [employer]

## 2023-12-21 NOTE — CARE COORDINATION
12/21/23 0946   Short Term Goal 1   STG Goal 1 Status: Patient Appears to be  Progressing toward treatment plan goal   Short Term Goal 2   STG Goal 2 Status: Patient Appears to be  Progressing toward treatment plan goal   Crisis/Safety/Discharge Plan   Discharge Plan Home with outpatient

## 2023-12-21 NOTE — GROUP NOTE
Group Therapy Note    Date: 12/21/2023    Group Start Time: 1100  Group End Time: 0872  Group Topic: Nursing    [facility] [address] Patino LPN        Group Therapy Note    Attendees: 5       Patient's Goal:  TO TAKE MEDS. Notes:  MEDICATIONS COMPLIANCE    Status After Intervention:  Improved    Participation Level:  Active Listener    Participation Quality: Appropriate and Attentive      Speech:  normal      Thought Process/Content: Logical      Affective Functioning: Congruent      Mood:  CALM      Level of consciousness:  Alert      Response to Learning: Able to verbalize current knowledge/experience      Endings: None Reported    Modes of Intervention: Education      Discipline Responsible: Licensed Practical Nurse      Signature:  Ly Shankar LPN

## 2023-12-22 PROCEDURE — 6370000000 HC RX 637 (ALT 250 FOR IP): Performed by: PSYCHIATRY & NEUROLOGY

## 2023-12-22 PROCEDURE — 6370000000 HC RX 637 (ALT 250 FOR IP): Performed by: PHYSICIAN ASSISTANT

## 2023-12-22 PROCEDURE — 1240000000 HC EMOTIONAL WELLNESS R&B

## 2023-12-22 RX ADMIN — POTASSIUM CHLORIDE 10 MEQ: 750 TABLET, EXTENDED RELEASE ORAL at 17:13

## 2023-12-22 RX ADMIN — ENALAPRIL MALEATE 10 MG: 5 TABLET ORAL at 08:12

## 2023-12-22 RX ADMIN — MIRTAZAPINE 7.5 MG: 15 TABLET, FILM COATED ORAL at 21:11

## 2023-12-22 RX ADMIN — BUPROPION HYDROCHLORIDE 450 MG: 150 TABLET ORAL at 08:10

## 2023-12-22 RX ADMIN — LURASIDONE HYDROCHLORIDE 80 MG: 40 TABLET, FILM COATED ORAL at 08:11

## 2023-12-22 RX ADMIN — HYDROXYZINE PAMOATE 50 MG: 25 CAPSULE ORAL at 21:11

## 2023-12-22 RX ADMIN — TRAZODONE HYDROCHLORIDE 100 MG: 100 TABLET ORAL at 21:11

## 2023-12-22 RX ADMIN — CHLORTHALIDONE 25 MG: 25 TABLET ORAL at 08:11

## 2023-12-22 RX ADMIN — ASPIRIN 81 MG: 81 TABLET, COATED ORAL at 08:11

## 2023-12-22 RX ADMIN — POTASSIUM CHLORIDE 10 MEQ: 750 TABLET, EXTENDED RELEASE ORAL at 08:12

## 2023-12-22 RX ADMIN — Medication 5000 UNITS: at 08:11

## 2023-12-22 RX ADMIN — LEVOTHYROXINE SODIUM 50 MCG: 0.05 TABLET ORAL at 05:51

## 2023-12-22 NOTE — GROUP NOTE
Group Therapy Note    Date: 12/22/2023    Group Start Time: 1100  Group End Time: 0153  Group Topic: Nursing    [facility] [address] LPN        Group Therapy Note    Attendees: 7       Patient's Goal:  TO TAKE MEDS. Notes:  MEDICATION COMPLIANCE    Status After Intervention:  Improved    Participation Level:  Active Listener    Participation Quality: Appropriate and Attentive      Speech:  normal      Thought Process/Content: Logical      Affective Functioning: Congruent      Mood:  CALM      Level of consciousness:  Alert      Response to Learning: Able to verbalize current knowledge/experience      Endings: None Reported    Modes of Intervention: Education      Discipline Responsible: Licensed Practical Nurse      Signature:  Prudence Yanes LPN

## 2023-12-22 NOTE — GROUP NOTE
Group Therapy Note    Date: 12/22/2023    Group Start Time: 0930  Group End Time: 4716  Group Topic: Community Meeting    [facility] [place], [address] [facility]        Group Therapy Note    Attendees:  Five (5)        Patient's Goal:   To feel better    Notes:   Slept 6 Hrs. Ate 1/2 of meals   Acknowledged Depression as 4, Anxiety 4, Pain 0  did not  comment on the  status of her progress     No thoughts of self harm  Is taking medications as prescribed  and no side effects     Status After Intervention:  Improved    Participation Level:  Active Listener    Participation Quality: Appropriate and Sharing      Speech:  normal and hesitant      Thought Process/Content: Linear      Affective Functioning: Congruent      Mood:  Reserved      Level of consciousness:  Alert and Attentive      Response to Learning: Able to verbalize current knowledge/experience      Endings: None Reported    Modes of Intervention: Support      Discipline Responsible: [address]      Signature:  Scott Acuna

## 2023-12-23 LAB
GLUCOSE BLD STRIP.AUTO-MCNC: 88 MG/DL (ref 65–100)
PERFORMED BY:: NORMAL

## 2023-12-23 PROCEDURE — 1240000000 HC EMOTIONAL WELLNESS R&B

## 2023-12-23 PROCEDURE — 6370000000 HC RX 637 (ALT 250 FOR IP): Performed by: PHYSICIAN ASSISTANT

## 2023-12-23 PROCEDURE — 82962 GLUCOSE BLOOD TEST: CPT

## 2023-12-23 PROCEDURE — 6370000000 HC RX 637 (ALT 250 FOR IP): Performed by: PSYCHIATRY & NEUROLOGY

## 2023-12-23 RX ADMIN — HYDROXYZINE PAMOATE 50 MG: 25 CAPSULE ORAL at 20:35

## 2023-12-23 RX ADMIN — LEVOTHYROXINE SODIUM 50 MCG: 0.05 TABLET ORAL at 05:51

## 2023-12-23 RX ADMIN — ASPIRIN 81 MG: 81 TABLET, COATED ORAL at 08:43

## 2023-12-23 RX ADMIN — LURASIDONE HYDROCHLORIDE 80 MG: 40 TABLET, FILM COATED ORAL at 08:42

## 2023-12-23 RX ADMIN — CHLORTHALIDONE 25 MG: 25 TABLET ORAL at 08:43

## 2023-12-23 RX ADMIN — Medication 5000 UNITS: at 08:43

## 2023-12-23 RX ADMIN — POTASSIUM CHLORIDE 10 MEQ: 750 TABLET, EXTENDED RELEASE ORAL at 08:43

## 2023-12-23 RX ADMIN — MIRTAZAPINE 7.5 MG: 15 TABLET, FILM COATED ORAL at 20:35

## 2023-12-23 RX ADMIN — POTASSIUM CHLORIDE 10 MEQ: 750 TABLET, EXTENDED RELEASE ORAL at 17:42

## 2023-12-23 RX ADMIN — TRAZODONE HYDROCHLORIDE 100 MG: 100 TABLET ORAL at 20:35

## 2023-12-23 RX ADMIN — BUPROPION HYDROCHLORIDE 450 MG: 150 TABLET ORAL at 08:43

## 2023-12-23 RX ADMIN — ENALAPRIL MALEATE 10 MG: 5 TABLET ORAL at 08:42

## 2023-12-23 NOTE — GROUP NOTE
Group Therapy Note    Date: 2023    Group Start Time: 1000  Group End Time: 8389  Group Topic: Community Meeting    [facility]        Group Therapy Note    Attendees: 5         Patient's Goal:  ***    Notes:  ***    Status After Intervention:  {Status After Intervention:587881229}    Participation Level: {Participation Level:974315129}    Participation Quality: {[facility] PARTICIPATION QUALITY:911595518}      Speech:  {[facility] CD_SPEECH:26092}      Thought Process/Content: {Thought Process/Content:983552063}      Affective Functioning: {Affective Functionin}      Mood: {Mood:039349119}      Level of consciousness:  {Level of consciousness:462810751}      Response to Learnin [facility] Responses to Learnin}      Endings: {[facility] Endings:47718}    Modes of Intervention: {[facility] Modes of Intervention:948764483}      Discipline Responsible: {[facility] Multidisciplinary:358553394}      Signature:  Alexa Rojas

## 2023-12-23 NOTE — GROUP NOTE
Group Therapy Note    Date: 12/22/2023    Group Start Time: 2000  Group End Time: 2045  Group Topic: Wrap-Up    [facility]    Nathan Loving CNA        Group Therapy Note    Attendees: 5       Patient's Goal:  none    Notes:  Participated in group    Status After Intervention:  Improved    Participation Level: Active Listener    Participation Quality: Appropriate      Speech:  normal      Thought Process/Content: Logical      Affective Functioning: Congruent      Mood: anxious and depressed      Level of consciousness:  Alert and Oriented x4      Response to Learning: Able to verbalize/acknowledge new learning, Capable of insight, Able to change behavior, and Progressing to goal      Endings: None Reported    Modes of Intervention: Activity      Discipline Responsible: [employer]      Signature:   Nathan Loving CNA

## 2023-12-23 NOTE — GROUP NOTE
Group Therapy Note    Date: 12/23/2023    Group Start Time: 1000  Group End Time: 8716  Group Topic: Community Meeting    [facility], [address] [facility] Therapy Note    Attendees: 5       Patient's Goal:  To Relax    Notes:      Status After Intervention:  Improved    Participation Level:  Active Listener    Participation Quality: Appropriate      Speech:  normal      Thought Process/Content: Logical      Affective Functioning: Congruent      Mood: anxious      Level of consciousness:  Alert      Response to Learning: Able to verbalize current knowledge/experience      Endings: None Reported    Modes of Intervention: Support      Discipline Responsible: [address] [facility]      Signature:  Jeni Amaya

## 2023-12-24 PROCEDURE — 6370000000 HC RX 637 (ALT 250 FOR IP): Performed by: PSYCHIATRY & NEUROLOGY

## 2023-12-24 PROCEDURE — 1240000000 HC EMOTIONAL WELLNESS R&B

## 2023-12-24 PROCEDURE — 6370000000 HC RX 637 (ALT 250 FOR IP): Performed by: PHYSICIAN ASSISTANT

## 2023-12-24 RX ADMIN — HYDROXYZINE PAMOATE 50 MG: 25 CAPSULE ORAL at 20:34

## 2023-12-24 RX ADMIN — POTASSIUM CHLORIDE 10 MEQ: 750 TABLET, EXTENDED RELEASE ORAL at 17:12

## 2023-12-24 RX ADMIN — LURASIDONE HYDROCHLORIDE 80 MG: 40 TABLET, FILM COATED ORAL at 08:46

## 2023-12-24 RX ADMIN — ASPIRIN 81 MG: 81 TABLET, COATED ORAL at 08:47

## 2023-12-24 RX ADMIN — LEVOTHYROXINE SODIUM 50 MCG: 0.05 TABLET ORAL at 06:08

## 2023-12-24 RX ADMIN — ENALAPRIL MALEATE 10 MG: 5 TABLET ORAL at 08:46

## 2023-12-24 RX ADMIN — TRAZODONE HYDROCHLORIDE 100 MG: 100 TABLET ORAL at 20:34

## 2023-12-24 RX ADMIN — POTASSIUM CHLORIDE 10 MEQ: 750 TABLET, EXTENDED RELEASE ORAL at 08:46

## 2023-12-24 RX ADMIN — MIRTAZAPINE 7.5 MG: 15 TABLET, FILM COATED ORAL at 20:34

## 2023-12-24 RX ADMIN — CHLORTHALIDONE 25 MG: 25 TABLET ORAL at 08:46

## 2023-12-24 RX ADMIN — BUPROPION HYDROCHLORIDE 450 MG: 150 TABLET ORAL at 08:45

## 2023-12-24 RX ADMIN — Medication 5000 UNITS: at 08:45

## 2023-12-24 NOTE — GROUP NOTE
Group Therapy Note    Date: 12/24/2023    Group Start Time: 1000  Group End Time: 6131  Group Topic: Community Meeting    [facility] [place]        Group Therapy Note    Attendees: 4       Patient's Goal:  To Relax    Notes:      Status After Intervention:  Improved    Participation Level:  Active Listener    Participation Quality: Appropriate      Speech:  normal      Thought Process/Content: Logical      Affective Functioning: Congruent      Mood: anxious      Level of consciousness:  Alert      Response to Learning: Able to verbalize/acknowledge new learning      Endings: None Reported    Modes of Intervention: Support      Discipline Responsible: [address] [facility]      Signature:  Tamara Brooke

## 2023-12-24 NOTE — GROUP NOTE
Group Therapy Note    Date: 12/24/2023    Group Start Time: 1100  Group End Time: 0769  Group Topic: Education Group - Inpatient    [facility] [address]     Rudolph Hatfield LPN        Group Therapy Note    Attendees: 5       Patient's Goal: TO TAKE MEDS. Notes:  MEDICATION COMPLIANCE    Status After Intervention:  Improved    Participation Level:  Active Listener    Participation Quality: Appropriate and Attentive      Speech:  normal      Thought Process/Content: Logical      Affective Functioning: Congruent      Mood:  CALM      Level of consciousness:  Alert      Response to Learning: Able to verbalize current knowledge/experience      Endings: None Reported    Modes of Intervention: Education      Discipline Responsible: Licensed Practical Nurse      Signature:  Rudolph Hatfield LPN

## 2023-12-25 PROCEDURE — 1240000000 HC EMOTIONAL WELLNESS R&B

## 2023-12-25 PROCEDURE — 6370000000 HC RX 637 (ALT 250 FOR IP): Performed by: PSYCHIATRY & NEUROLOGY

## 2023-12-25 PROCEDURE — 6370000000 HC RX 637 (ALT 250 FOR IP): Performed by: PHYSICIAN ASSISTANT

## 2023-12-25 RX ORDER — ASPIRIN 81 MG/1
81 TABLET ORAL DAILY
Qty: 30 TABLET | Refills: 3 | Status: SHIPPED | OUTPATIENT
Start: 2023-12-26

## 2023-12-25 RX ORDER — HYDROXYZINE PAMOATE 50 MG/1
50 CAPSULE ORAL 4 TIMES DAILY PRN
Qty: 90 CAPSULE | Refills: 0 | Status: SHIPPED | OUTPATIENT
Start: 2023-12-25 | End: 2024-01-24

## 2023-12-25 RX ORDER — MIRTAZAPINE 7.5 MG/1
7.5 TABLET, FILM COATED ORAL NIGHTLY
Qty: 30 TABLET | Refills: 3 | Status: SHIPPED | OUTPATIENT
Start: 2023-12-25

## 2023-12-25 RX ORDER — LURASIDONE HYDROCHLORIDE 80 MG/1
80 TABLET, FILM COATED ORAL
Qty: 30 TABLET | Refills: 0 | Status: SHIPPED | OUTPATIENT
Start: 2023-12-26

## 2023-12-25 RX ORDER — TRAZODONE HYDROCHLORIDE 100 MG/1
100 TABLET ORAL NIGHTLY PRN
Qty: 30 TABLET | Refills: 0 | Status: SHIPPED | OUTPATIENT
Start: 2023-12-25

## 2023-12-25 RX ORDER — BUPROPION HYDROCHLORIDE 450 MG/1
450 TABLET, FILM COATED, EXTENDED RELEASE ORAL DAILY
Qty: 30 TABLET | Refills: 3 | Status: SHIPPED | OUTPATIENT
Start: 2023-12-26

## 2023-12-25 RX ADMIN — BUPROPION HYDROCHLORIDE 450 MG: 150 TABLET ORAL at 08:36

## 2023-12-25 RX ADMIN — POTASSIUM CHLORIDE 10 MEQ: 750 TABLET, EXTENDED RELEASE ORAL at 18:51

## 2023-12-25 RX ADMIN — HYDROXYZINE PAMOATE 50 MG: 25 CAPSULE ORAL at 20:42

## 2023-12-25 RX ADMIN — CHLORTHALIDONE 25 MG: 25 TABLET ORAL at 08:36

## 2023-12-25 RX ADMIN — ASPIRIN 81 MG: 81 TABLET, COATED ORAL at 08:36

## 2023-12-25 RX ADMIN — Medication 5000 UNITS: at 08:36

## 2023-12-25 RX ADMIN — LEVOTHYROXINE SODIUM 50 MCG: 0.05 TABLET ORAL at 05:57

## 2023-12-25 RX ADMIN — ENALAPRIL MALEATE 10 MG: 5 TABLET ORAL at 08:36

## 2023-12-25 RX ADMIN — TRAZODONE HYDROCHLORIDE 100 MG: 100 TABLET ORAL at 20:42

## 2023-12-25 RX ADMIN — MIRTAZAPINE 7.5 MG: 15 TABLET, FILM COATED ORAL at 20:42

## 2023-12-25 RX ADMIN — LURASIDONE HYDROCHLORIDE 80 MG: 40 TABLET, FILM COATED ORAL at 08:36

## 2023-12-25 RX ADMIN — POTASSIUM CHLORIDE 10 MEQ: 750 TABLET, EXTENDED RELEASE ORAL at 08:36

## 2023-12-25 NOTE — GROUP NOTE
Group Therapy Note    Date: 12/25/2023    Group Start Time: 1400  Group End Time: 8217  Group Topic: Nursing    [facility] [address]        Group Therapy Note    Attendees: 5       Patient's Goal:  To convey their emotions through music    Notes:  Pt was very attentive and participated in group. Pt chose, \"I'll be there,\" by the [email or web address] group. She reports that she loves [employer] music and she listened to that song on the radio on her first date with her . Status After Intervention:  Improved    Participation Level:  Active Listener and Interactive    Participation Quality: Appropriate, Attentive, Sharing, and Supportive      Speech:  normal      Thought Process/Content: Logical      Affective Functioning: Congruent      Mood: euthymic      Level of consciousness:  Alert, Oriented x4, and Attentive      Response to Learning: Able to verbalize current knowledge/experience, Able to verbalize/acknowledge new learning, Able to retain information, Capable of insight, and Progressing to goal      Endings: None Reported    Modes of Intervention: Socialization      Discipline Responsible: Registered Nurse      Signature:  Teresa Rg RN

## 2023-12-25 NOTE — GROUP NOTE
Group Therapy Note    Date: 12/25/2023    Group Start Time: 0945  Group End Time: 1655  Group Topic: Community Meeting    [facility] [place]        Group Therapy Note    Attendees: Five  5       Patient's Goal:   To go home    Notes:  Sleep 7-8 hrs    eats 1/2 of meals    Depression 1  Anxiety 1  Pain   0 No thoughts of self harm     Progress 1   Feels she is able  to manage symptoms   Would like to talk to doctor about medications    Status After Intervention:  Improved    Participation Level:  Active Listener and Interactive    Participation Quality: Attentive and Sharing      Speech:  normal      Thought Process/Content: Linear      Affective Functioning: Congruent      Mood: calm      Level of consciousness:  Attentive      Response to Learning: Able to retain information and Capable of insight      Endings: None Reported    Modes of Intervention: Support      Discipline Responsible: [address] Kish[address] Hicks      Signature:  Avtar Torres

## 2023-12-25 NOTE — DISCHARGE SUMMARY
appointments. Patient is to f/u with internist/PCP as directed in the next 1-2 weeks. They will return to the ED if problems worsen or return. Discharge risk assessment has been performed. FOLLOW-UP CARE:    Activity as tolerated  Regular diet  Wound Care: none needed. Follow up per discharge planning/Case management           PROGNOSIS:   ***Fair ***Guarded / Poor---- based on nature of patient's pathology/ies and treatment compliance issues. Prognosis is greatly dependent upon patient's ability follow discharge recommendations, take medications as described, and follow up with scheduled appointments.              DISCHARGE MEDICATIONS:     Informed consent given for the use of following psychotropic medications:     Medication List        START taking these medications      aspirin 81 MG EC tablet  Take 1 tablet by mouth daily  Start taking on: December 26, 2023     hydrOXYzine pamoate 50 MG capsule  Commonly known as: VISTARIL  Take 1 capsule by mouth 4 times daily as needed for Anxiety     mirtazapine 7.5 MG tablet  Commonly known as: REMERON  Take 1 tablet by mouth nightly     traZODone 100 MG tablet  Commonly known as: DESYREL  Take 1 tablet by mouth nightly as needed for Sleep            CHANGE how you take these medications      buPROPion HCl ER (XL) 450 MG Tb24  Take 450 mg by mouth daily  Start taking on: December 26, 2023  What changed:   medication strength  when to take this     lurasidone 80 MG Tabs tablet  Commonly known as: LATUDA  Take 1 tablet by mouth daily (with breakfast)  Start taking on: December 26, 2023  What changed:   medication strength  how much to take  when to take this            CONTINUE taking these medications      chlorthalidone 25 MG tablet  Commonly known as: HYGROTON     enalapril 10 MG tablet  Commonly known as: VASOTEC     levothyroxine 50 MCG tablet  Commonly known as: SYNTHROID     polyethylene glycol 17 g packet  Commonly known as: GLYCOLAX     potassium

## 2023-12-25 NOTE — GROUP NOTE
Group Therapy Note    Date: 12/24/2023    Group Start Time: 2000  Group End Time: 2045  Group Topic: Wrap-Up    [facility]    Jose Andreson CNA        Group Therapy Note    Attendees: 3       Patient's Goal:  none    Noteparticipated in group  Status After Intervention:  Improved    Participation Level: Active Listener    Participation Quality: Appropriate      Speech:  normal      Thought Process/Content: Logical      Affective Functioning: Congruent      Mood: anxious and depressed      Level of consciousness:  Alert and Oriented x4      Response to Learning: Able to verbalize current knowledge/experience, Able to verbalize/acknowledge new learning, Able to retain information, Capable of insight, Able to change behavior, and Progressing to goal      Endings: None Reported    Modes of Intervention: Activity      Discipline Responsible: [employer]      Signature:   Jose Anderson CNA

## 2023-12-26 VITALS
TEMPERATURE: 97.3 F | DIASTOLIC BLOOD PRESSURE: 58 MMHG | OXYGEN SATURATION: 97 % | HEART RATE: 75 BPM | RESPIRATION RATE: 16 BRPM | HEIGHT: 62 IN | WEIGHT: 210 LBS | BODY MASS INDEX: 38.64 KG/M2 | SYSTOLIC BLOOD PRESSURE: 103 MMHG

## 2023-12-26 PROCEDURE — 6370000000 HC RX 637 (ALT 250 FOR IP): Performed by: PSYCHIATRY & NEUROLOGY

## 2023-12-26 PROCEDURE — 6370000000 HC RX 637 (ALT 250 FOR IP): Performed by: PHYSICIAN ASSISTANT

## 2023-12-26 RX ADMIN — POTASSIUM CHLORIDE 10 MEQ: 750 TABLET, EXTENDED RELEASE ORAL at 08:26

## 2023-12-26 RX ADMIN — Medication 5000 UNITS: at 08:26

## 2023-12-26 RX ADMIN — LURASIDONE HYDROCHLORIDE 80 MG: 40 TABLET, FILM COATED ORAL at 08:26

## 2023-12-26 RX ADMIN — ENALAPRIL MALEATE 10 MG: 5 TABLET ORAL at 08:26

## 2023-12-26 RX ADMIN — CHLORTHALIDONE 25 MG: 25 TABLET ORAL at 08:26

## 2023-12-26 RX ADMIN — BUPROPION HYDROCHLORIDE 450 MG: 150 TABLET ORAL at 08:28

## 2023-12-26 RX ADMIN — LEVOTHYROXINE SODIUM 50 MCG: 0.05 TABLET ORAL at 05:51

## 2023-12-26 RX ADMIN — ASPIRIN 81 MG: 81 TABLET, COATED ORAL at 08:26

## 2023-12-26 NOTE — PLAN OF CARE
Patient participating in program while remaining safe and respecting q15 min safety rounds.
Problem: Depression  Goal: Will be euthymic at discharge  Description: INTERVENTIONS:  1. Administer medication as ordered  2. Provide emotional support via 1:1 interaction with staff  3. Encourage involvement in milieu/groups/activities  4.  Monitor for social isolation  12/19/2023 2305 by Darby Fragoso RN  Outcome: Progressing  12/19/2023 1211 by Johann Fitzgerald RN  Outcome: Not Progressing
Problem: Discharge Planning  Goal: Discharge to home or other facility with appropriate resources  12/20/2023 2251 by Michael Camp RN  Outcome: Progressing  12/20/2023 1100 by Alicia Acosta RN  Outcome: Progressing     Problem: Self Harm/Suicidality  Goal: Will have no self-injury during hospital stay  Description: INTERVENTIONS:  1. Ensure constant observer at bedside with Q15M safety checks  2. Maintain a safe environment  3. Secure patient belongings  4. Ensure family/visitors adhere to safety recommendations  5. Ensure safety tray has been added to patient's diet order  6. Every shift and PRN: Re-assess suicidal risk via Frequent Screener    12/20/2023 2251 by Michael Camp RN  Outcome: Progressing  12/20/2023 1100 by Alicia Acosta RN  Outcome: Progressing     Problem: Safety - Adult  Goal: Free from fall injury  12/20/2023 2251 by Michael Camp RN  Outcome: Progressing  12/20/2023 1100 by Alicia Acosta RN  Outcome: Progressing     Problem: Depression  Goal: Will be euthymic at discharge  Description: INTERVENTIONS:  1. Administer medication as ordered  2. Provide emotional support via 1:1 interaction with staff  3. Encourage involvement in milieu/groups/activities  4. Monitor for social isolation  Outcome: Progressing     Problem: Behavior  Goal: Pt/Family maintain appropriate behavior and adhere to behavioral management agreement, if implemented  Description: INTERVENTIONS:  1. Assess patient/family's coping skills and  non-compliant behavior (including use of illegal substances)  2. Notify security of behavior or suspected illegal substances which indicate the need for search of the family and/or belongings  3. Encourage verbalization of thoughts and concerns in a socially appropriate manner  4. Utilize positive, consistent limit setting strategies supporting safety of patient, staff and others  5.  Encourage participation in the decision making process
Problem: Discharge Planning  Goal: Discharge to home or other facility with appropriate resources  12/21/2023 2223 by Aminta Angulo RN  Outcome: Progressing  12/21/2023 0843 by Brannon Machuca RN  Outcome: Progressing     Problem: Self Harm/Suicidality  Goal: Will have no self-injury during hospital stay  Description: INTERVENTIONS:  1. Ensure constant observer at bedside with Q15M safety checks  2. Maintain a safe environment  3. Secure patient belongings  4. Ensure family/visitors adhere to safety recommendations  5. Ensure safety tray has been added to patient's diet order  6. Every shift and PRN: Re-assess suicidal risk via Frequent Screener    12/21/2023 2223 by Aminta Angulo RN  Outcome: Progressing  12/21/2023 0843 by Brannon Machuca RN  Outcome: Progressing     Problem: Safety - Adult  Goal: Free from fall injury  12/21/2023 2223 by Aminta Angulo RN  Outcome: Progressing  12/21/2023 0843 by Brannon Machuca RN  Outcome: Progressing     Problem: Depression  Goal: Will be euthymic at discharge  Description: INTERVENTIONS:  1. Administer medication as ordered  2. Provide emotional support via 1:1 interaction with staff  3. Encourage involvement in milieu/groups/activities  4. Monitor for social isolation  12/21/2023 2223 by Aminta Angulo RN  Outcome: Progressing  12/21/2023 0843 by Brannon Machuca RN  Outcome: Progressing     Problem: Behavior  Goal: Pt/Family maintain appropriate behavior and adhere to behavioral management agreement, if implemented  Description: INTERVENTIONS:  1. Assess patient/family's coping skills and  non-compliant behavior (including use of illegal substances)  2. Notify security of behavior or suspected illegal substances which indicate the need for search of the family and/or belongings  3. Encourage verbalization of thoughts and concerns in a socially appropriate manner  4.  Utilize positive, consistent limit setting strategies supporting safety of
Problem: Discharge Planning  Goal: Discharge to home or other facility with appropriate resources  12/22/2023 2305 by Mary Sosa RN  Outcome: Progressing  12/22/2023 0923 by Patricia Hancock RN  Outcome: Progressing     Problem: Safety - Adult  Goal: Free from fall injury  12/22/2023 2305 by Mary Sosa RN  Outcome: Progressing  12/22/2023 0923 by Patricia Hancock RN  Outcome: Progressing     Problem: Depression  Goal: Will be euthymic at discharge  Description: INTERVENTIONS:  1. Administer medication as ordered  2. Provide emotional support via 1:1 interaction with staff  3. Encourage involvement in milieu/groups/activities  4. Monitor for social isolation  12/22/2023 2305 by Mary Sosa RN  Outcome: Progressing  12/22/2023 0923 by Patricia Hancock RN  Outcome: Progressing     Problem: Anxiety  Goal: Will report anxiety at manageable levels  Description: INTERVENTIONS:  1. Administer medication as ordered  2. Teach and rehearse alternative coping skills  3. Provide emotional support with 1:1 interaction with staff  Outcome: Progressing     Problem: Sleep Disturbance  Goal: Will exhibit normal sleeping pattern  Description: INTERVENTIONS:  1. Administer medication as ordered  2. Decrease environmental stimuli, including noise, as appropriate  3. Discourage social isolation and naps during the day  Outcome: Progressing     Problem: Self Care Deficit  Goal: Return ADL status to a safe level of function  Description: INTERVENTIONS:  1. Administer medication as ordered  2. Assess ADL deficits and provide assistive devices as needed  3. Obtain PT/OT consults as needed  4. Assist and instruct patient to increase activity and self care as tolerated  Outcome: Progressing     Problem: Self Harm/Suicidality  Goal: Will have no self-injury during hospital stay  Description: INTERVENTIONS:  1. Ensure constant observer at bedside with Q15M safety checks  2. Maintain a safe environment  3.   Secure patient
Problem: Discharge Planning  Goal: Discharge to home or other facility with appropriate resources  12/23/2023 2007 by Rosalinda Freire RN  Outcome: Progressing  12/23/2023 1327 by Lucho Fitzpatrick RN  Outcome: Progressing     Problem: Safety - Adult  Goal: Free from fall injury  12/23/2023 2007 by Rosalinda Freire RN  Outcome: Progressing  12/23/2023 1327 by Lucho Fitzpatrick RN  Outcome: Progressing     Problem: Depression  Goal: Will be euthymic at discharge  Description: INTERVENTIONS:  1. Administer medication as ordered  2. Provide emotional support via 1:1 interaction with staff  3. Encourage involvement in milieu/groups/activities  4. Monitor for social isolation  12/23/2023 2007 by Rosalinda Freire RN  Outcome: Progressing  12/23/2023 1327 by Lucho Fitzpatrick RN  Outcome: Progressing     Problem: Anxiety  Goal: Will report anxiety at manageable levels  Description: INTERVENTIONS:  1. Administer medication as ordered  2. Teach and rehearse alternative coping skills  3. Provide emotional support with 1:1 interaction with staff  Outcome: Rasheeda Gallo (Taken 12/23/2023 1322 by Lucho Fitzpatrick RN)  Will report anxiety at manageable levels: Teach and rehearse alternative coping skills     Problem: Sleep Disturbance  Goal: Will exhibit normal sleeping pattern  Description: INTERVENTIONS:  1. Administer medication as ordered  2. Decrease environmental stimuli, including noise, as appropriate  3. Discourage social isolation and naps during the day  Outcome: Progressing     Problem: Self Care Deficit  Goal: Return ADL status to a safe level of function  Description: INTERVENTIONS:  1. Administer medication as ordered  2. Assess ADL deficits and provide assistive devices as needed  3. Obtain PT/OT consults as needed  4.  Assist and instruct patient to increase activity and self care as tolerated  Outcome: Progressing
Problem: Discharge Planning  Goal: Discharge to home or other facility with appropriate resources  12/24/2023 2231 by David Foote RN  Outcome: Progressing  12/24/2023 0844 by Dain Gurrola RN  Outcome: Progressing     Problem: Safety - Adult  Goal: Free from fall injury  12/24/2023 2231 by David Foote RN  Outcome: Progressing  12/24/2023 0844 by Dain Gurrola RN  Outcome: Progressing     Problem: Depression  Goal: Will be euthymic at discharge  Description: INTERVENTIONS:  1. Administer medication as ordered  2. Provide emotional support via 1:1 interaction with staff  3. Encourage involvement in milieu/groups/activities  4. Monitor for social isolation  Outcome: Progressing     Problem: Anxiety  Goal: Will report anxiety at manageable levels  Description: INTERVENTIONS:  1. Administer medication as ordered  2. Teach and rehearse alternative coping skills  3. Provide emotional support with 1:1 interaction with staff  12/24/2023 2231 by David Foote RN  Outcome: Progressing  12/24/2023 0844 by Dain Gurrola RN  Outcome: Progressing     Problem: Sleep Disturbance  Goal: Will exhibit normal sleeping pattern  Description: INTERVENTIONS:  1. Administer medication as ordered  2. Decrease environmental stimuli, including noise, as appropriate  3. Discourage social isolation and naps during the day  Outcome: Progressing     Problem: Self Care Deficit  Goal: Return ADL status to a safe level of function  Description: INTERVENTIONS:  1. Administer medication as ordered  2. Assess ADL deficits and provide assistive devices as needed  3. Obtain PT/OT consults as needed  4.  Assist and instruct patient to increase activity and self care as tolerated  12/24/2023 2231 by David Foote RN  Outcome: Progressing  12/24/2023 0844 by Dain Gurrola RN  Outcome: Progressing
Problem: Discharge Planning  Goal: Discharge to home or other facility with appropriate resources  12/25/2023 2128 by Jeanna Navas RN  Outcome: Progressing  12/25/2023 0819 by Claudette Lo, RN  Outcome: Progressing     Problem: Safety - Adult  Goal: Free from fall injury  12/25/2023 2128 by Jeanna Navas RN  Outcome: Progressing  12/25/2023 0819 by Claudette Lo, RN  Outcome: Progressing     Problem: Depression  Goal: Will be euthymic at discharge  Description: INTERVENTIONS:  1. Administer medication as ordered  2. Provide emotional support via 1:1 interaction with staff  3. Encourage involvement in milieu/groups/activities  4. Monitor for social isolation  12/25/2023 2128 by Jeanna Navas RN  Outcome: Progressing  12/25/2023 0819 by Claudette Lo, RN  Outcome: Progressing     Problem: Anxiety  Goal: Will report anxiety at manageable levels  Description: INTERVENTIONS:  1. Administer medication as ordered  2. Teach and rehearse alternative coping skills  3. Provide emotional support with 1:1 interaction with staff  12/25/2023 2128 by Jeanna Navas RN  Outcome: Progressing  12/25/2023 0819 by Claudette Lo, RN  Outcome: Progressing  [employer]sheets (Taken 12/25/2023 4730)  Will report anxiety at manageable levels: Teach and rehearse alternative coping skills     Problem: Sleep Disturbance  Goal: Will exhibit normal sleeping pattern  Description: INTERVENTIONS:  1. Administer medication as ordered  2. Decrease environmental stimuli, including noise, as appropriate  3. Discourage social isolation and naps during the day  12/25/2023 2128 by Jeanna Navas RN  Outcome: Progressing  12/25/2023 0819 by Claudette Lo, RN  Outcome: Progressing     Problem: Self Care Deficit  Goal: Return ADL status to a safe level of function  Description: INTERVENTIONS:  1. Administer medication as ordered  2. Assess ADL deficits and provide assistive devices as needed  3.
Problem: Discharge Planning  Goal: Discharge to home or other facility with appropriate resources  Outcome: Progressing     Problem: Self Harm/Suicidality  Goal: Will have no self-injury during hospital stay  Description: INTERVENTIONS:  1. Ensure constant observer at bedside with Q15M safety checks  2. Maintain a safe environment  3. Secure patient belongings  4. Ensure family/visitors adhere to safety recommendations  5. Ensure safety tray has been added to patient's diet order  6. Every shift and PRN: Re-assess suicidal risk via Frequent Screener    12/13/2023 2027 by Luis Eduardo Myers RN  Outcome: Progressing  12/13/2023 1716 by Travis Feliciano RN  Outcome: Progressing     Problem: Safety - Adult  Goal: Free from fall injury  12/13/2023 2027 by Luis Eduardo Myers RN  Outcome: Progressing  12/13/2023 1716 by Travis Feliciano RN  Outcome: Progressing     Problem: Depression  Goal: Will be euthymic at discharge  Description: INTERVENTIONS:  1. Administer medication as ordered  2. Provide emotional support via 1:1 interaction with staff  3. Encourage involvement in milieu/groups/activities  4. Monitor for social isolation  12/13/2023 2027 by Luis Eduardo Myers RN  Outcome: Progressing  12/13/2023 1716 by Travis Feliciano RN  Outcome: Progressing     Problem: Anxiety  Goal: Will report anxiety at manageable levels  Description: INTERVENTIONS:  1. Administer medication as ordered  2. Teach and rehearse alternative coping skills  3. Provide emotional support with 1:1 interaction with staff  Outcome: Progressing     Problem: Self Care Deficit  Goal: Return ADL status to a safe level of function  Description: INTERVENTIONS:  1. Administer medication as ordered  2. Assess ADL deficits and provide assistive devices as needed  3. Obtain PT/OT consults as needed  4.  Assist and instruct patient to increase activity and self care as tolerated  Outcome: Progressing
Problem: Discharge Planning  Goal: Discharge to home or other facility with appropriate resources  Outcome: Progressing     Problem: Self Harm/Suicidality  Goal: Will have no self-injury during hospital stay  Description: INTERVENTIONS:  1. Ensure constant observer at bedside with Q15M safety checks  2. Maintain a safe environment  3. Secure patient belongings  4. Ensure family/visitors adhere to safety recommendations  5. Ensure safety tray has been added to patient's diet order  6. Every shift and PRN: Re-assess suicidal risk via Frequent Screener    12/16/2023 2227 by Anni Singh RN  Outcome: Progressing  12/16/2023 1018 by Sohan Duarte RN  Outcome: Progressing     Problem: Safety - Adult  Goal: Free from fall injury  12/16/2023 2227 by Anni Singh RN  Outcome: Progressing  12/16/2023 1018 by Sohan Duarte RN  Outcome: Progressing     Problem: Depression  Goal: Will be euthymic at discharge  Description: INTERVENTIONS:  1. Administer medication as ordered  2. Provide emotional support via 1:1 interaction with staff  3. Encourage involvement in milieu/groups/activities  4. Monitor for social isolation  Outcome: Progressing     Problem: Behavior  Goal: Pt/Family maintain appropriate behavior and adhere to behavioral management agreement, if implemented  Description: INTERVENTIONS:  1. Assess patient/family's coping skills and  non-compliant behavior (including use of illegal substances)  2. Notify security of behavior or suspected illegal substances which indicate the need for search of the family and/or belongings  3. Encourage verbalization of thoughts and concerns in a socially appropriate manner  4. Utilize positive, consistent limit setting strategies supporting safety of patient, staff and others  5. Encourage participation in the decision making process about the behavioral management agreement  6.  If a visitor's behavior poses a threat to safety call refer to
Problem: Discharge Planning  Goal: Discharge to home or other facility with appropriate resources  Outcome: Progressing     Problem: Self Harm/Suicidality  Goal: Will have no self-injury during hospital stay  Description: INTERVENTIONS:  1. Ensure constant observer at bedside with Q15M safety checks  2. Maintain a safe environment  3. Secure patient belongings  4. Ensure family/visitors adhere to safety recommendations  5. Ensure safety tray has been added to patient's diet order  6. Every shift and PRN: Re-assess suicidal risk via Frequent Screener    12/17/2023 2324 by Shahrzad Mercado RN  Outcome: Progressing  12/17/2023 1428 by Kory Naranjo RN  Outcome: Progressing     Problem: Safety - Adult  Goal: Free from fall injury  12/17/2023 2324 by Shahrzad Mercado RN  Outcome: Progressing  12/17/2023 1428 by Kory Naranjo RN  Outcome: Progressing     Problem: Depression  Goal: Will be euthymic at discharge  Description: INTERVENTIONS:  1. Administer medication as ordered  2. Provide emotional support via 1:1 interaction with staff  3. Encourage involvement in milieu/groups/activities  4. Monitor for social isolation  12/17/2023 2324 by Shahrzad Mercado RN  Outcome: Progressing  12/17/2023 1428 by Kory Naranjo RN  Outcome: Progressing     Problem: Anxiety  Goal: Will report anxiety at manageable levels  Description: INTERVENTIONS:  1. Administer medication as ordered  2. Teach and rehearse alternative coping skills  3. Provide emotional support with 1:1 interaction with staff  12/17/2023 1428 by Kory Naranjo RN  Outcome: Progressing     Problem: Sleep Disturbance  Goal: Will exhibit normal sleeping pattern  Description: INTERVENTIONS:  1. Administer medication as ordered  2. Decrease environmental stimuli, including noise, as appropriate  3.  Discourage social isolation and naps during the day  12/17/2023 2324 by Shahrzad Mercado RN  Outcome: Progressing  12/17/2023
Pt attending groups and interacting appropriately with staff and peers.
INTERVENTIONS:  1. Administer medication as ordered  2. Teach and rehearse alternative coping skills  3. Provide emotional support with 1:1 interaction with staff  Outcome: Progressing     Problem: Sleep Disturbance  Goal: Will exhibit normal sleeping pattern  Description: INTERVENTIONS:  1. Administer medication as ordered  2. Decrease environmental stimuli, including noise, as appropriate  3. Discourage social isolation and naps during the day  Outcome: Progressing     Problem: Self Care Deficit  Goal: Return ADL status to a safe level of function  Description: INTERVENTIONS:  1. Administer medication as ordered  2. Assess ADL deficits and provide assistive devices as needed  3. Obtain PT/OT consults as needed  4.  Assist and instruct patient to increase activity and self care as tolerated  Outcome: Progressing

## 2023-12-27 NOTE — BH NOTE
B:   Patient alert and oriented x 3. Pt. States depression is 3. Pt. States Anxiety is 3. Pt. denies Hallucinations. Pt. denies Delusions. Pt. denies SI.  Pt. denies HI. Pt. cooperative with Assessment. Pt.'s behavior pleasant and cooperative. I:    If patient is disoriented, reorient pt. Build trust with patient, by therapeutic listening and Groups. Encourage pt. To attend and Participate in Groups. Provide Medications as ordered and needed. Encourage pt. To be up for all meals and snacks, and consume all of each. Encourage pt. To interact with staff and peers in a positive manner. Encourage pt. To keep good hygiene. Q 15 minute safety checks. R:   Pt. did attend and Participate in Group. Pt. Is Compliant with Medications   Yes. Pt. is getting up for meals and snacks. Pt. Consumes 100% of Meals. Pt. is interacting with Peers. Pt.'s hygiene is Good. Pt. does not have any safety issues. P:   Pt. Will develop and continue to utilize positive Coping skills. Pt. Will continue to comply with Plan of Care toward Discharge. Pt. Will continue to stay safe on the unit. Pt will be preparing for discharge tomorrow.
B:   Patient alert and oriented x 3. Pt. States depression is 3. Pt. States Anxiety is 5. Pt. denies Hallucinations. Pt. denies Delusions. Pt. denies SI.  Pt. denies HI. Pt. cooperative with Assessment. Pt.'s behavior Cooperative and Pleasant. I:    If patient is disoriented, reorient pt. Build trust with patient, by therapeutic listening and Groups. Encourage pt. To attend and Participate in Groups. Provide Medications as ordered and needed. Encourage pt. To be up for all meals and snacks, and consume all of each. Encourage pt. To interact with staff and peers in a positive manner. Encourage pt. To keep good hygiene. Q 15 minute safety checks. R:   Pt. did attend and Participate in Group. Pt. Is Compliant with Medications   Yes. Pt. is getting up for meals and snacks. Pt. Consumes 75% of Meals. Pt. is, interacting with Peers. Pt.'s hygiene is Good. Pt. does not, have any safety issues. P:   Pt. Will develop and continue to utilize positive Coping skills. Pt. Will continue to comply with Plan of Care toward Discharge. Pt. Will continue to stay safe on the unit.
B:   Patient alert and oriented x 3. Pt. States depression is 7. Pt. States Anxiety is 8. Pt. denies Hallucinations. Pt. denies Delusions. Pt. denies SI.  Pt. denies HI. Pt. cooperative with Assessment. Pt.'s behavior Anxious, Attention Seeking, and Cooperative. Ecchymosis to left periorbital eye. I:    If patient is disoriented, reorient pt. Build trust with patient, by therapeutic listening and Groups. Encourage pt. To attend and Participate in Groups. Provide Medications as ordered and needed. Encourage pt. To be up for all meals and snacks, and consume all of each. Encourage pt. To interact with staff and peers in a positive manner. Encourage pt. To keep good hygiene. Q 15 minute safety checks. R:   Pt. did attend and Participate in Group. Pt. Is Compliant with Medications   Yes. Pt. is getting up for meals and snacks. Pt. Consumes 75% of Meals. Pt. is, interacting with Peers. Pt.'s hygiene is Good. Pt. does not have any safety issues. P:   Pt. Will develop and continue to utilize positive Coping skills. Pt. Will continue to comply with Plan of Care toward Discharge. Pt. Will continue to stay safe on the unit.
B:   Patient alert and oriented x 3. Pt. States depression is 9. Pt. States Anxiety is 9. Pt. denies Hallucinations. Pt. denies Delusions. Pt. denies SI.  Pt. denies HI. Pt. cooperative with Assessment. Pt.'s behavior Anxious, Attention Seeking, Cooperative, and Pleasant. I:    If patient is disoriented, reorient pt. Build trust with patient, by therapeutic listening and Groups. Encourage pt. To attend and Participate in Groups. Provide Medications as ordered and needed. Encourage pt. To be up for all meals and snacks, and consume all of each. Encourage pt. To interact with staff and peers in a positive manner. Encourage pt. To keep good hygiene. Q 15 minute safety checks. R:   Pt. did not attend and Participate in Group. Pt. Is Compliant with Medications   Yes. Pt. is getting up for meals and snacks. Pt. Consumes 50% of Meals. Pt. is, interacting with Peers. Pt.'s hygiene is Poor. Pt. does not have any safety issues except fall risk for which she is closely monitored. P:   Pt. Will develop and continue to utilize positive Coping skills. Pt. Will continue to comply with Plan of Care toward Discharge. Pt. Will continue to stay safe on the unit. OT saw patient today. PT consult pending.
B:   Patient alert and oriented x 4. Pt. States depression is 10. Pt. States Anxiety is 10. Pt. denies Hallucinations. Pt. denies Delusions. Pt. denies SI.  Pt. denies HI. Pt. cooperative with Assessment. Pt.'s behavior Anxious. Pt states, \"I am always depressed and anxious. I don't know why probably because I have bipolar depression. I take my medications and talk to my counselor, that helps me. \" Writer educated pt on trying top find some positive coping skills that can be used along with medication and therapy to increase the efficacy of her treatment. Pt verbalized agreement. I:    If patient is disoriented, reorient pt. Build trust with patient, by therapeutic listening and Groups. Encourage pt. To attend and Participate in Groups. Provide Medications as ordered and needed. Encourage pt. To be up for all meals and snacks, and consume all of each. Encourage pt. To interact with staff and peers in a positive manner. Encourage pt. To keep good hygiene. Q 15 minute safety checks. R:   Pt. did attend and Participate in Group. Pt. Is Compliant with Medications   Yes. Pt. is getting up for meals and snacks. Pt. Consumes 50% of Meals. Pt. is, interacting with Peers. Pt.'s hygiene is Fair. Pt. does not, have any safety issues. P:   Pt. Will develop at least 3 positive coping skills by the end of the day. Pt. Will continue to comply with Plan of Care toward Discharge. Pt. Will continue to stay safe on the unit.
B:   Patient alert and oriented x 4. Pt. States depression is 2. Pt. States Anxiety is 2. Pt. denies Hallucinations. Pt. denies Delusions. Pt. denies SI.  Pt. denies HI. Pt. cooperative with Assessment. Pt.'s behavior Cooperative and Pleasant. I:    If patient is disoriented, reorient pt. Build trust with patient, by therapeutic listening and Groups. Encourage pt. To attend and Participate in Groups. Provide Medications as ordered and needed. Encourage pt. To be up for all meals and snacks, and consume all of each. Encourage pt. To interact with staff and peers in a positive manner. Encourage pt. To keep good hygiene. Q 15 minute safety checks. R:   Pt. did attend and Participate in Group. Pt. Is Compliant with Medications   Yes. Pt. is getting up for meals and snacks. Pt. Consumes 75% of Meals. Pt. is, interacting with Peers. Pt.'s hygiene is Good. Pt. does not, have any safety issues. P:   Pt. Will develop and continue to utilize positive Coping skills. Pt. Will continue to comply with Plan of Care toward Discharge. Pt. Will continue to stay safe on the unit.
B:   Patient alert and oriented x 4. Pt. States depression is 3. Pt. States Anxiety is 4. Pt. denies Hallucinations. Pt. denies Delusions. Pt. denies SI.  Pt. denies HI. Pt. cooperative with Assessment. Pt.'s behavior Anxious, Cooperative, and Pleasant. Pt received Trazodone 100mg po and Atarax 50mg po PRN     I:    If patient is disoriented, reorient pt. Build trust with patient, by therapeutic listening and Groups. Encourage pt. To attend and Participate in Groups. Provide Medications as ordered and needed. Encourage pt. To be up for all meals and snacks, and consume all of each. Encourage pt. To interact with staff and peers in a positive manner. Encourage pt. To keep good hygiene. Q 15 minute safety checks. R:   Pt. did not attend and didn't Participate in Group. Pt. Is Compliant with Medications   Yes. Pt. Didn't get up for night snack. Pt. Consumes 75% of Meals. Pt. is, interacting with Peers and staff. Pt.'s hygiene is Good. Pt. does not, have any safety issues. P:   Pt. Will develop and continue to utilize positive Coping skills. Pt. Will continue to comply with Plan of Care toward Discharge. Pt. Will continue to stay safe on the unit.      0600: Pt slept throughout the night without any distress noted while conducting rounds. Pt received her AM Synthroid.
B:   Patient alert and oriented x 4. Pt. States depression is 4. Pt. States Anxiety is 3. Pt. denies Hallucinations. Pt. denies Delusions. Pt. denies SI.  Pt. denies HI. Pt. cooperative with Assessment. Pt.'s behavior Anxious, Cooperative, and Pleasant. Pt did explain, no more thoughts of wanting to go to sleep and not walk up. She feels like GOD has given her a second chance. Stated \"my  is very religous and I pray a lot\" Explained she has always had depression but the anxiety is all new and it's worse with different seasons, like the winter and fall. Pt received Trazodone 100mg po and Atarax 50mg po PRN     I:    If patient is disoriented, reorient pt. Build trust with patient, by therapeutic listening and Groups. Encourage pt. To attend and Participate in Groups. Provide Medications as ordered and needed. Encourage pt. To be up for all meals and snacks, and consume all of each. Encourage pt. To interact with staff and peers in a positive manner. Encourage pt. To keep good hygiene. Q 15 minute safety checks. R:   Pt. did attend and Participate in Group. Pt. Is Compliant with Medications   Yes. Pt. is getting up for meals and snacks. Pt. Consumes 75% of Meals. Pt. is, interacting with Peers and staff. Pt.'s hygiene is Good. Pt. does not, have any safety issues. P:   Pt. Will develop and continue to utilize positive Coping skills. Pt. Will continue to comply with Plan of Care toward Discharge. Pt. Will continue to stay safe on the unit.      0600: Pt slept throughout the night without any distress noted while conducting rounds. Pt received her AM Synthroid.
B:   Patient alert and oriented x 4. Pt. States depression is 5. Pt. States Anxiety is 1. Pt. denies Hallucinations. Pt. denies Delusions. Pt. denies SI.  Pt. denies HI. Pt. cooperative with Assessment. Pt.'s behavior Cooperative and Pleasant. Pt continues to state her depression and anxiety are bothering her. Writer educated pt on different activities she can do. Writer encouraged the pt to walk around the unit, interact with the other patients, read a book, journal, watch tv/movies, play a game. Writer informed pt that these activities would help her cope with her anxiety and depression rather than laying in the bed in the dark all day. Pt did not respond. I:    If patient is disoriented, reorient pt. Build trust with patient, by therapeutic listening and Groups. Encourage pt. To attend and Participate in Groups. Provide Medications as ordered and needed. Encourage pt. To be up for all meals and snacks, and consume all of each. Encourage pt. To interact with staff and peers in a positive manner. Encourage pt. To keep good hygiene. Q 15 minute safety checks. R:   Pt. did attend and Participate in Group. Pt. Is Compliant with Medications   Yes. Pt. is getting up for meals and snacks. Pt. Consumes 75% of Meals. Pt. is, interacting with Peers. Pt.'s hygiene is Fair. Pt. does not, have any safety issues. P:   Pt. Will try to get out of bed and do different activities to cope with her anxiety and depression for the next 3 days. Pt. Will continue to comply with Plan of Care toward Discharge. Pt. Will continue to stay safe on the unit.
B:   Patient alert and oriented x 4. Pt. States depression is 5. Pt. States Anxiety is 5. Pt. denies Hallucinations. Pt. denies Delusions. Pt. denies SI.  Pt. denies HI. Pt. cooperative with Assessment. Pt.'s behavior Anxious, Cooperative, and Pleasant. Pt noted fixated on night PRN Trazodone and Atarax because that is the only way she was going to get to sleep. Pt came to nursing station 3 times during shift report asking about the medication and requesting her medication. Explained each time, medications would be given after group. During assessment, pt explained due to her being so sick with depression and anxiety it causes her to worry about things. Pt started on Remeron 7.5mg po QHS and had a issue with the dose being so small. Explained, that is the normal dose that the doctor starts all patients on.     I:    If patient is disoriented, reorient pt. Build trust with patient, by therapeutic listening and Groups. Encourage pt. To attend and Participate in Groups. Provide Medications as ordered and needed. Encourage pt. To be up for all meals and snacks, and consume all of each. Encourage pt. To interact with staff and peers in a positive manner. Encourage pt. To keep good hygiene. Q 15 minute safety checks. R:   Pt. did attend and Participate in Group. Pt. Is Compliant with Medications   Yes. Pt. is getting up for meals and snacks. Pt. Consumes 75% of Meals. Pt. is, interacting with Peers and staff. Pt.'s hygiene is Good. Pt. does not, have any safety issues. P:   Pt. Will develop and continue to utilize positive Coping skills. Pt. Will continue to comply with Plan of Care toward Discharge. Pt. Will continue to stay safe on the unit.     0600: Pt slept throughout the night without any distress noted while conducting rounds. Pt received her Synthroid.
B:   Patient alert and oriented x 4. Pt. States depression is 8. Pt. States Anxiety is 5. Pt. denies Hallucinations. Pt. denies Delusions. Pt. denies SI.  Pt. denies HI. Pt. cooperative with Assessment. Pt.'s behavior Cooperative and Pleasant. Pt is hyper fixated on being depressed, she reports that since she is depressed it is difficult for her to find focus on small moments of jami. She also reports that since she is depressed she sees things negatively instead of positively. Writer gave pt a notebook and encouraged pt to try and find small moments of jami and things she is grateful for. I:    If patient is disoriented, reorient pt. Build trust with patient, by therapeutic listening and Groups. Encourage pt. To attend and Participate in Groups. Provide Medications as ordered and needed. Encourage pt. To be up for all meals and snacks, and consume all of each. Encourage pt. To interact with staff and peers in a positive manner. Encourage pt. To keep good hygiene. Q 15 minute safety checks. R:   Pt. did attend and Participate in Group. Pt. Is Compliant with Medications   Yes. Pt. is getting up for meals and snacks. Pt. Consumes 50% of Meals. Pt. is, interacting with Peers. Pt.'s hygiene is Good. Pt. does not, have any safety issues. P:   Pt. Will develop and utilize positive Coping skills to improve her anxiety for the next three days . Pt. Will continue to comply with Plan of Care toward Discharge. Pt. Will continue to stay safe on the unit.
BEHAVIORAL HEALTH GROUP NOTE FOR NON ATTENDEES        DATE: 12/13/2023      GROUP START: 2000 200\  GROUP END: 2045          GROUP TYPE: Wrap-Up        ATTENDANCE: Not appropriate for group due to sympton severity and Excused by staff          SIGNATURE:  Jeannine Cervantes CNA     Patient Came To [facility] Today Have A Lot  Of Anxiety.  She Was Excuse From Group By [employer]
BEHAVIORAL HEALTH GROUP NOTE FOR NON ATTENDEES        DATE: 12/14/2023      GROUP START: 2000    GROUP END: 2045          GROUP TYPE: Wrap-Up        ATTENDANCE: Refused to participate          SIGNATURE:  Chelsea Shaw CNA       Patient Did Not Participate In Group
BEHAVIORAL HEALTH GROUP NOTE FOR NON ATTENDEES        DATE: 12/18/2023      GROUP START: 2000    GROUP END: 2045          GROUP TYPE: Wrap-Up        ATTENDANCE: Refused to participate          SIGNATURE:  Cheryl Burton CNA Ms. Kenyatta Grimes came into group about 8:30 She Participated the rest of the group
BEHAVIORAL HEALTH GROUP NOTE FOR NON ATTENDEES        DATE: 12/23/2023      GROUP START: 2000    GROUP END: 2045          GROUP TYPE: Wrap-Up        ATTENDANCE: Refused to participate          SIGNATURE:  Cheryl Burton CNA     Patient said she wasn't coming to group
Behavioral Health Transition Record to Provider    Patient Name: Clari Leggett  YOB: 1955  Medical Record Number: [id number]  Date of Admission: 12/13/2023  Date of Discharge: 12/26/23    Attending Provider: Jaz Dean MD  Discharging Provider: Dr. Celina Liang  To contact this individual call [phone] and ask the  to page. If unavailable, ask to be transferred to [facility] Provider on call. [phone] Provider will be available on call 24/7 and during holidays. Primary Care Provider: Huma Arriola MD    Allergies   Allergen Reactions    Lamictal [Lamotrigine] Anaphylaxis    Tetracyclines & Related Anaphylaxis       Reason for Admission:   REASON FOR HOSPITALIZATION:  CC: \" I am just nervous and depressed\". Pt admitted under a temporary half-way order (TDO) severe depression and anxiety with suicidal ideations proving to be an imminent danger to self. HISTORY OF PRESENT ILLNESS:    The patient, Clari Leggett, is a 70 y.o. White (non-) female with a past psychiatric history significant for bipolar disorder, mixed, severe, who presents at this time with complaints of worsening symptoms. The symptoms are acute in nature and are of significant severity impacting daily life and function. The patient's condition has been precipitated by unknown trigger and psychosocial stressors. Patient felt so bad and she just wanted to feel better that she took approximately 20 Ambien so she would \"not wake up\"     The patient however is well-established with psychiatrist at [facility], Dr. Erlin Steinberg. She was last physically seen on November 16, 2023 and follow-up for her bipolar type I with rapid cycling. At that time she was continued on Latuda 60 mg, and resuming Wellbutrin and increasing hydroxyzine to 50 mg 4 times a day for anxiety and sleep she was asked to continue with therapy with home she had not been doing on a regular basis.      The patient is
Behavioral Health Treatment Team Note     Patient goal(s) for today: Pt reports \"Keep feeling better. \"  Treatment team focus/goals: medication management, safe discharge planning, attend groups and activities daily    Progress note: Pt observed seated in the day room. Pt does continue to endorse depression but states it has improved since admission. Pt alert and oriented x4. Pt denies SI, HI, AVH. Pt has been up and attending groups and talking more to peers and staff. Inpatient level of care is needed in order to stabilize pt further on medications. LOS:  9  Expected LOS: 10-12 days    Insurance info/prescription coverage:  [employer] Medicare  Date of last family contact:  Pt has been speaking to .   Family requesting physician contact today:  No  Discharge plan:  Home with outpatient  Guns in the home:  No  Outpatient provider(s):  [facility]    Participating treatment team members: Hipolito Gonzalez, Willis Donovan, [facility]
Behavioral Health Treatment Team Note     Patient goal(s) for today: Pt reports \"Keep getting better. I am still depressed. I guess work on that. \"  Treatment team focus/goals: medication management, safe discharge planning, attend groups and activities daily    Progress note: Pt observed lying in bed. Pt presents as flat and depressed but reports less anxiety than when she was first admitted. Pt states she has been attending groups and will continue to attend. Pt alert and oriented x4. Pt denies SI, HI, AVH. Inpatient level of care is needed in order to stabilize pt further on medications. LOS:  5  Expected LOS: 7-8 days    Insurance info/prescription coverage:  An[employer] Medicare  Date of last family contact:  Pt reports that she spoke with her  today. Pt reports talking to him daily and that it's going well.   Family requesting physician contact today:  No  Discharge plan:  Home with outpatient  Guns in the home:  No- will confirm with  prior to d/c  Outpatient provider(s):  [facility]    Participating treatment team members: Hipolito Gonzalez, Willis Donovan, [facility]
[place]al Health Treatment Team Note     Patient goal(s) for today: Pt reports \"Keep working on feeling better. \"   Treatment team focus/goals: medication management, safe discharge planning, attend groups and activities daily     Progress note: Pt observed seated in her bed. Pt alert and oriented x4. Pt continues to have some depression but states that it has improved since admission. Pt continues to be tearful at times but attends groups and is able to engage. Pt denies current SI, HI, AVH. Inpatient level of care is needed in order to stabilize pt further on medications. LOS:  8  Expected LOS: 9-10 days    Insurance info/prescription coverage:  An[employer] Medicare  Date of last family contact:  Pt has been speaking with . Has not signed release as of yet.   Family requesting physician contact today:  No  Discharge plan:  Home with outpatient   Guns in the home:  No  Outpatient provider(s):  [facility]    Participating treatment team members: Clari Leggett, Emma Aden, [facility] [place], [place]
DISCHARGE SUMMARY    NAME:Francine Hemphill  : 1955  MRN: [id number]    The patient Hitesh Kaiser exhibits the ability to control behavior in a less restrictive environment. Patient's level of functioning is improving. No assaultive/destructive behavior has been observed for the past 24 hours. No suicidal/homicidal threat or behavior has been observed for the past 24 hours. There is no evidence of serious medication side effects. Patient has not been in physical or protective restraints for at least the past 24 hours. If weapons involved, how are they secured? N/A    Is patient aware of and in agreement with discharge plan? Yes    Arrangements for medication:  Prescriptions On file    Copy of discharge instructions to provider?:  Yes    Arrangements for transportation home:  Pt will be picked up and taken home by family member. Keep all follow up appointments as scheduled, continue to take prescribed medications per physician instructions.   Mental health crisis number:  851 or your local mental health crisis line number at [address] [facility] Emergency WARM LINE      [phone] ([phone])      M-F: 9am to 9pm      Sat & Sun: [address] [facility] suicide prevention lines:                             8-340-USFSHOX ([phone])       [phone]-TALK ([phone])    Crisis Text Line:  Text HOME to 714596
Follow up: Writer called and left a message.
PSYCHOSOCIAL ASSESSMENT  :Patient identifying info:   Jv Kerr is a 70 y.o., female admitted 12/13/2023 10:47 AM     Presenting problem and precipitating factors: Pt was admitted voluntarily due to a suicide attempt in which pt attempted to overdose on Ambien. Pt reports a significant hx of Bipolar Disorder and severe depression episodes. Pt reports that she only has small periods of time when she feels \"okay\" but that she is constantly cycling. Pt reports that she is not sleeping and excessive crying. Pt did report that she took the Ambien to sleep and never wake up. Mental status assessment: Pt alert and oriented x4. Pt denies SI, HI, AVH. Pt does endorse significant depression and anxiety. Pt displays a flat affect. Pt open to additional services. Strengths/Recreation/Coping Skills:Pt reports that she will be getting into shahid. Pt enjoys music and watching movies. Collateral information: Pt has not signed release for  but has been speaking to him. Will encourage pt to allow collateral prior to d/c. Current psychiatric /substance abuse providers and contact info: [facility], Pt reports she has a psychiatrist and therapist.    Previous psychiatric/substance abuse providers and response to treatment: Pt reports being hospitalized a few years ago. Pt takes medication and sees a psychiatrist.     Family history of mental illness or substance abuse: Pt does report significant family hx of psychiatric disorders    Substance abuse history:    Social History     Tobacco Use    Smoking status: Never    Smokeless tobacco: Never   Substance Use Topics    Alcohol use: Never       History of biomedical complications associated with substance abuse: None reported. Patient's current acceptance of treatment or motivation for change: Pt was able to establish a treatment goal and signed tx plan, \"Get better. \"     Family constellation: Pt lives with her . Pt has adult children.  Pt opened up to
Patient was released at  this morning  , She did not have any thoughts of self harm She was  escorted by Niki Eisenmenger  and  was met by her .
Pt received   resting in her bed appeared     pt refused  snack and did not attend wrap up group       Upon assessment . Pt alert and oriented x 4 denies SI/HI,  denies A/V hallucinations .      Rated depression and anxiety as 1        Pt accepted HS medication given at 2042 she requetsed  both  Trazodone 100mg to help with sleep and   And Given  same time at 2042 po prn Vitsaril 50mg for anxiety/to help with sleep, prn helpful         Pt  sleeping  by 2100 , remained sleeping as of this time      No violent no self harming behavior did not  noticed or reported
Pt received   resting in her bed appeared be sleeping       pt refused  snack and attend wrap up group       Upon assessment . Pt alert and oriented x 4 denies SI/HI,  denies A/V hallucinations . Still reporting anxiety feelings.  but saying not like before        Pt accepted HS medication given at 2034  as she requetsed  both  Trazodone 100mg to help with sleep and   And Given  same time at 2034  po prn Vitsaril 50mg for anxiety/to help with sleep, prn helpful         Pt  sleeping  by 2100 , remained sleeping as of this time      No violent no self harming behavior did not  noticed or reported
Pt received   resting in her bed appeared be sleeping       pt refused  snack and didn't attend award up group remained resting in bed . Upon assessment . Pt alert and oriented x 4 denies SI/HI,  denies A/V hallucinations . Still reporting anxiety feelings.  .       Pt accepted HS medication given at 2100  as she requetsed  both  Trazodone 100mg to help with sleep and   At 2209 pt came to nursing station [facility] for sleeping medication Given  same time at 2100  po prn Vitsaril 50mg for anxiety/to help with sleep, prn helpful         Pt  sleeping  again by 2130 0  , remained sleeping as of this time      No violent no self harming behavior did not  noticed or reported
Pt received  on the phone talking. pt refused  snack and didn't attend award up group remained resting in bed . Pt atlked to Dr Tonio العلي via Skype: New order to increase Trazodone 100mg prn HS  to help with sleep. Upon assessment . Pt alert and oriented x 4 denies SI/HI,  denies A/V hallucinations . still complaining of anxiety.  And focused on her sleep and her medication,       Pt accepted HS medication given at 2040  Trazodone 100mg to help with sleep        Pt  sleeping  again by 2130 , remained sleeping as of this time      No violent no self harming behavior did not  noticed or reported
Pt received  resting in her bed appeared be sleeping       pt refused  snack and didn't attend award up group remained resting in bed . Upon assessment . Pt alert and oriented x 4 denies SI/HI,  denies A/V hallucinations . Pt rated depression and anxiety as 5 and feels better  in general than before  .        Pt accepted HS medication given at 2034  Trazodone 100mg to help with sleep she was asking also for Vitaril but in formed that if she is unable to sleep to come ask for it, pt appeared be sleeping by 2100 ,   At 2209 pt came to nursing station [facility] for sleeping medication Given  po prn Vitsaril 50mg for anxiety/to help with sleep, prn helpful         Pt  sleeping  again by 2230  , remained sleeping as of this time      No violent no self harming behavior did not  noticed or reported
Pt received  taking shower at shift changed and her  called twice to talk to her once she finished she aleman dhim back and talked to him, pt still noticed shaking     pt refused  snack and didn't attend award up group remained resting in bed . Upon assessment . Pt alert and oriented x 4 denies SI/HI,  denies A/V hallucinations . and c/o anxiety 10/10 and preoccupied about her sleeping medciation. Pt accepted HS medication including her new  sleeping medication Remeron 7.5mg, educated about it , pt appeared to be sleeping by 2120  awake again by 2303 and requested  and given at 2303  Trazodone 50mg to help with sleep        Pt  sleeping  again by 2320 .       No violent no self harming behavior did not  noticed or reported
Pt received in hallway  then went to her room,      pt refused  snack and didn't attend award up group as this is her first night of admission, Pt   Rated depression as10 and anxiety as 10. Upon assessment . Pt alert and oriented x 4 denies SI/HI,  denies A/V hallucinations . rated depression and anxiety as above rate bit doesn't know why she she feel like that, also noticed with left forehead eye area hematoma/ purple/greenish bruise she said that from falling at home when she overdosed. Pt accepted HS medication. and requested  and given at 2007 Trazodone 50mg t help with sleep       pt noticed  coming  to hallway at her room door looking at staff and down hallway  then returned to her room earlier she asked staff that she hear the Tv sound and want it to be lowered although TV sound was at acceptable levl not loud .    Pt start  sleeping by 2230,      No violent no self harming behavior did not  noticed or reported
Pt slept overnight,   remained sleeping as of this time     No violent no self harming behavior noticed or reported
Pt slept overnight, woke up once to use bathroom then slept  remained sleeping as of this time     No violent no self harming behavior noticed or reported
Pt. Belongings given back to pt., verified all there, signed for. Discharge instructions explained to pt. Including, Follow up appt. With [facility] in [facility] . Medications called into [facility] [employer] [facility]. Pt. Denies SI/HI at time of discharge. Pt. is not a Smoker. Smoking cessation education was notprovided. Pt. is not a drinker. Alcohol Education was not Provided. Discharge Paperwork and H & P, faxed to [facility] [phone], With success sheet. Pt. was not discharged on 2 or more Antipsychotics. Pt. Displayed no safety concerns at discharge. Pt. Escorted to front by staff for transportation by [facility], to home.
TREATMENT TEAM COMPLETED     Attending meeting was as follows:  Patient, Dr. Zachery Weiss, RN Unit Manager, Erin Calloway, Charge RN and Liz Arambula, Licensed Clinical Therapist.        Meeting was conducted via In person. Daily Treatment Team consists of the following:     Pt. Cognitive status:  Alert and Oriented x 4. Patient's anxiety level has improved since admission. States she still feels very depressed. Tolerates her medications well. States she is sleeping and eating well. Patient attends groups and participates well. Denies SI ideations. Pt. Attending Groups: Yes    Pt. Participating in groups:  Yes    Pt. Homicidal / Suicidal: denies    Pt. Behaviors:  Pleasant and Cooperative    Pt.  Compliant with Medications:  Yes      New Medication orders:  No      Discharge Plan:  Discharge home with Wrap around services
TREATMENT TEAM COMPLETED     Attending meeting was as follows:  Patient, MANUEL Coronado, Liborio Caballero, RN Unit Manager, Emiliana Garcia, Charge RN and Beatrice Mejia, Licensed Clinical Therapist.       Meeting was conducted via [employer] consists of the following:     Pt. Cognitive status:  Alert and anxious. Patient has visible shaking of hands while talking. States she has panic attacks with depression that last anywhere from 1-2 months. Patient sees a psychiatrist named Dr. Jackie Melendez iv in [facility]. Patient states that she was taking Vistaril  50mg PO QID to help with anxiety but is not effective. Patient is very tearful and and shows symptoms of Borderline Personality Disorder. Very attention seeking. Patient states that her  is her greatest supporter. States that they have been  for 40+years. Patient has a horrible bruise noted to left eye from where she fell at home. CT of head negative. Patient states that she has no triggers that cause these episodes to occur. Patient admitted that she overdosed on Ambien because she wanted to go to sleep and not wake up. Patient stated that she has had thoughts of suicide in the past but has never acted on these thoughts until last night. Patient has contracted for safety and will come and get staff if these feelings occur. Pt. Attending Groups: Yes    Pt. Participating in groups:  Yes    Pt. Homicidal / Suicidal: Denies at this time    Pt. Behaviors:  Anxious     Pt.  Compliant with Medications:  Yes          New Medication orders:  No      Discharge Plan:  Home with follow up with current psychiatrist.
TREATMENT TEAM COMPLETED     Attending meeting was as follows:  Patient, MANUEL Rose, Ivan Gallardo, ANTWON Unit Manager, Berna Back RN and aDvid Allison, Licensed Clinical Therapist.        Meeting was conducted via [employer] consists of the following:     Pt. Cognitive status:  Alert and Oriented x 4. Patient is pleasant and cooperative but has issues with anxiety. Patient is shaking less today and seems to be more calmer than yesterday. Patient did not sleep at all last night and medications have been adjusted in hopes that patient will be able to sleep tonight. Patient enjoys groups and activities. Pt. Attending Groups: Yes    Pt. Participating in groups:  Yes    Pt. Homicidal / Suicidal: denies    Pt. Behaviors:  Pleasant and cooperative    Pt. Compliant with Medications:  Yes          New Medication orders:  Yes      Discharge Plan:  Home with outpatient services.
TREATMENT TEAM COMPLETED     Attending meeting was as follows:  Patient, Writer, and Gal Arriaga. Meeting was conducted via in person       Daily Treatment Team consists of the following:     Pt. Cognitive status:   alert and oriented x4     Pt. Attending Groups: Yes    Pt. Participating in groups:  Yes    Pt. Homicidal / Suicidal: normal    Pt. Behaviors:  Cooperative and Pleasant    Pt. Compliant with Medications:  Yes          New Medication orders:  No      Discharge Plan:  Home     Pt anxiety has improved, the severity of her anxiety when she got here has drastically decreased. She has also been asking for less PRN's for anxiety.
TREATMENT TEAM COMPLETED    Attending meeting was as follows:  Patient, Mino Fam RN Mgr, AMARILIS Anthony, Writer, and Sowmya Bran. Meeting was conducted via telehealth. Daily Treatment Team consists of the following:     Pt. Cognitive status:  Awake and anxious. Pt. Attending Groups: Yes    Pt. Participating in groups:  Yes    Pt. Homicidal / Suicidal: normal    Pt. Behaviors:  Anxious and Cooperative    Pt. Compliant with Medications:  Yes          New Medication orders:  Yes - New order for Remeron 7.5 mg for insomnia and increase Latuda to 80 mg. Pt will continue on Wellbutrin  mg daily. Discharge Plan:  Home with .
TREATMENT TEAM COMPLETED    Attending meeting was as follows:  Patient, Writer, and Dr. Leticia Coffman. Meeting was conducted via telehealth. Daily Treatment Team consists of the following:     Pt. Cognitive status:  Awake    Pt. Attending Groups: Yes    Pt. Participating in groups:  Yes    Pt. Homicidal / Suicidal: normal    Pt. Behaviors:  Anxious, Controlled, Cooperative, and Pleasant    Pt. Compliant with Medications:  Yes  New Medication orders:  No  Discharge Plan:  Home      B:   Patient alert and oriented x 4. Pt. States depression is 4. Pt. States Anxiety is 3. Denies Hallucinations, denies Delusions. Pt. denies SI and  HI. Cooperative with Assessment. Pt.'s behavior is Anxious, but Cooperative and Pleasant. Pt stated, no more thoughts of wanting to go to sleep and not wake up. Explained she has always had depression but the anxiety is all new and it's worse with different seasons. I:    If patient is disoriented, reorient pt. Build trust with patient, by therapeutic listening and Groups. Encourage pt to attend and Participate in Groups. Provide Medications as ordered and needed. Encourage pt to be up for all meals and snacks and to consume all of each. Encourage pt to interact with staff and peers in a positive manner. Encourage pt to keep good hygiene. Q15 minute safety checks. R:   Pt. did attend and Participate in Group. Pt. Is Compliant with Medications. Pt is getting up for meals and snacks. Pt Consumes 75% of Meals. Pt is interacting with Peers and staff. Pt's hygiene is Good. Pt does not have any safety issues. P: Pt Will develop and continue to utilize positive Coping skills. Pt Will continue to comply with Plan of Care toward Discharge. Pt Will continue to stay safe on the unit.
instructed patient that she is in a unit where she will be well cared for and that as a need arises she should ask at that time how to meet that need. Eventually, her anxiety will francine somewhat, and she will remember better and become comfortable.                      Shane Lundberg RN